# Patient Record
Sex: FEMALE | Race: WHITE | Employment: UNEMPLOYED | URBAN - METROPOLITAN AREA
[De-identification: names, ages, dates, MRNs, and addresses within clinical notes are randomized per-mention and may not be internally consistent; named-entity substitution may affect disease eponyms.]

---

## 2018-12-01 ENCOUNTER — HOSPITAL ENCOUNTER (OUTPATIENT)
Age: 48
Setting detail: OBSERVATION
Discharge: HOME | End: 2018-12-02
Attending: EMERGENCY MEDICINE | Admitting: SURGERY
Payer: SELF-PAY

## 2018-12-01 ENCOUNTER — APPOINTMENT (OUTPATIENT)
Dept: CT IMAGING | Age: 48
End: 2018-12-01
Attending: NURSE PRACTITIONER
Payer: SELF-PAY

## 2018-12-01 ENCOUNTER — ANESTHESIA (OUTPATIENT)
Dept: SURGERY | Age: 48
End: 2018-12-01
Payer: SELF-PAY

## 2018-12-01 ENCOUNTER — ANESTHESIA EVENT (OUTPATIENT)
Dept: SURGERY | Age: 48
End: 2018-12-01
Payer: SELF-PAY

## 2018-12-01 DIAGNOSIS — N28.89 LEFT RENAL MASS: ICD-10-CM

## 2018-12-01 DIAGNOSIS — R10.84 ABDOMINAL PAIN, GENERALIZED: ICD-10-CM

## 2018-12-01 DIAGNOSIS — K57.30 DIVERTICULOSIS OF LARGE INTESTINE WITHOUT HEMORRHAGE: ICD-10-CM

## 2018-12-01 DIAGNOSIS — K35.80 ACUTE APPENDICITIS, UNSPECIFIED ACUTE APPENDICITIS TYPE: Primary | ICD-10-CM

## 2018-12-01 DIAGNOSIS — M25.512 ACUTE PAIN OF LEFT SHOULDER: ICD-10-CM

## 2018-12-01 PROBLEM — K37 APPENDICITIS: Status: ACTIVE | Noted: 2018-12-01

## 2018-12-01 LAB
ALBUMIN SERPL-MCNC: 3.9 G/DL (ref 3.4–5)
ALBUMIN/GLOB SERPL: 1.1 {RATIO} (ref 0.8–1.7)
ALP SERPL-CCNC: 88 U/L (ref 45–117)
ALT SERPL-CCNC: 35 U/L (ref 13–56)
ANION GAP SERPL CALC-SCNC: 7 MMOL/L (ref 3–18)
APPEARANCE UR: ABNORMAL
AST SERPL-CCNC: 23 U/L (ref 15–37)
BACTERIA URNS QL MICRO: ABNORMAL /HPF
BASOPHILS # BLD: 0 K/UL (ref 0–0.1)
BASOPHILS NFR BLD: 0 % (ref 0–2)
BILIRUB DIRECT SERPL-MCNC: <0.1 MG/DL (ref 0–0.2)
BILIRUB SERPL-MCNC: 0.4 MG/DL (ref 0.2–1)
BILIRUB UR QL: NEGATIVE
BUN SERPL-MCNC: 15 MG/DL (ref 7–18)
BUN/CREAT SERPL: 14 (ref 12–20)
CALCIUM SERPL-MCNC: 9.7 MG/DL (ref 8.5–10.1)
CHLORIDE SERPL-SCNC: 102 MMOL/L (ref 100–108)
CK MB CFR SERPL CALC: 1.9 % (ref 0–4)
CK MB SERPL-MCNC: 2.7 NG/ML (ref 5–25)
CK SERPL-CCNC: 140 U/L (ref 26–192)
CO2 SERPL-SCNC: 29 MMOL/L (ref 21–32)
COLOR UR: ABNORMAL
CREAT SERPL-MCNC: 1.07 MG/DL (ref 0.6–1.3)
DIFFERENTIAL METHOD BLD: ABNORMAL
EOSINOPHIL # BLD: 0.2 K/UL (ref 0–0.4)
EOSINOPHIL NFR BLD: 1 % (ref 0–5)
EPITH CASTS URNS QL MICRO: ABNORMAL /LPF (ref 0–5)
ERYTHROCYTE [DISTWIDTH] IN BLOOD BY AUTOMATED COUNT: 12.6 % (ref 11.6–14.5)
GLOBULIN SER CALC-MCNC: 3.4 G/DL (ref 2–4)
GLUCOSE SERPL-MCNC: 110 MG/DL (ref 74–99)
GLUCOSE UR STRIP.AUTO-MCNC: NEGATIVE MG/DL
HCG UR QL: NEGATIVE
HCT VFR BLD AUTO: 40.4 % (ref 35–45)
HGB BLD-MCNC: 14 G/DL (ref 12–16)
HGB UR QL STRIP: NEGATIVE
INR PPP: 1 (ref 0.8–1.2)
KETONES UR QL STRIP.AUTO: NEGATIVE MG/DL
LACTATE BLD-SCNC: 0.86 MMOL/L (ref 0.4–2)
LEUKOCYTE ESTERASE UR QL STRIP.AUTO: ABNORMAL
LIPASE SERPL-CCNC: 181 U/L (ref 73–393)
LYMPHOCYTES # BLD: 3.1 K/UL (ref 0.9–3.6)
LYMPHOCYTES NFR BLD: 22 % (ref 21–52)
MCH RBC QN AUTO: 33 PG (ref 24–34)
MCHC RBC AUTO-ENTMCNC: 34.7 G/DL (ref 31–37)
MCV RBC AUTO: 95.3 FL (ref 74–97)
MONOCYTES # BLD: 0.7 K/UL (ref 0.05–1.2)
MONOCYTES NFR BLD: 5 % (ref 3–10)
NEUTS SEG # BLD: 10 K/UL (ref 1.8–8)
NEUTS SEG NFR BLD: 72 % (ref 40–73)
NITRITE UR QL STRIP.AUTO: POSITIVE
PH UR STRIP: 5 [PH] (ref 5–8)
PLATELET # BLD AUTO: 300 K/UL (ref 135–420)
PMV BLD AUTO: 9.8 FL (ref 9.2–11.8)
POTASSIUM SERPL-SCNC: 4.3 MMOL/L (ref 3.5–5.5)
PROT SERPL-MCNC: 7.3 G/DL (ref 6.4–8.2)
PROT UR STRIP-MCNC: NEGATIVE MG/DL
PROTHROMBIN TIME: 12.6 SEC (ref 11.5–15.2)
RBC # BLD AUTO: 4.24 M/UL (ref 4.2–5.3)
RBC #/AREA URNS HPF: 0 /HPF (ref 0–5)
SODIUM SERPL-SCNC: 138 MMOL/L (ref 136–145)
SP GR UR REFRACTOMETRY: 1.01 (ref 1–1.03)
TROPONIN I SERPL-MCNC: <0.02 NG/ML (ref 0–0.04)
UROBILINOGEN UR QL STRIP.AUTO: 0.2 EU/DL (ref 0.2–1)
WBC # BLD AUTO: 14 K/UL (ref 4.6–13.2)
WBC URNS QL MICRO: ABNORMAL /HPF (ref 0–4)

## 2018-12-01 PROCEDURE — 77030016151 HC PROTCTR LNS DFOG COVD -B: Performed by: SURGERY

## 2018-12-01 PROCEDURE — 77030008477 HC STYL SATN SLP COVD -A: Performed by: ANESTHESIOLOGY

## 2018-12-01 PROCEDURE — 93005 ELECTROCARDIOGRAM TRACING: CPT

## 2018-12-01 PROCEDURE — 74011000250 HC RX REV CODE- 250

## 2018-12-01 PROCEDURE — 74011250636 HC RX REV CODE- 250/636: Performed by: NURSE PRACTITIONER

## 2018-12-01 PROCEDURE — 74011250636 HC RX REV CODE- 250/636: Performed by: NURSE ANESTHETIST, CERTIFIED REGISTERED

## 2018-12-01 PROCEDURE — 81001 URINALYSIS AUTO W/SCOPE: CPT

## 2018-12-01 PROCEDURE — 77030007955 HC PCH ENDOSC SPEC J&J -B: Performed by: SURGERY

## 2018-12-01 PROCEDURE — 96361 HYDRATE IV INFUSION ADD-ON: CPT

## 2018-12-01 PROCEDURE — 80048 BASIC METABOLIC PNL TOTAL CA: CPT

## 2018-12-01 PROCEDURE — 96365 THER/PROPH/DIAG IV INF INIT: CPT

## 2018-12-01 PROCEDURE — 74177 CT ABD & PELVIS W/CONTRAST: CPT

## 2018-12-01 PROCEDURE — 81025 URINE PREGNANCY TEST: CPT

## 2018-12-01 PROCEDURE — 77030018875 HC APPL CLP LIG4 J&J -B: Performed by: SURGERY

## 2018-12-01 PROCEDURE — 74011000250 HC RX REV CODE- 250: Performed by: SURGERY

## 2018-12-01 PROCEDURE — 74011636320 HC RX REV CODE- 636/320: Performed by: EMERGENCY MEDICINE

## 2018-12-01 PROCEDURE — 77030002967 HC SUT PDS J&J -B: Performed by: SURGERY

## 2018-12-01 PROCEDURE — 77030008602 HC TRCR ENDOSC EPATH J&J -B: Performed by: SURGERY

## 2018-12-01 PROCEDURE — 77030008683 HC TU ET CUF COVD -A: Performed by: ANESTHESIOLOGY

## 2018-12-01 PROCEDURE — 77030031139 HC SUT VCRL2 J&J -A: Performed by: SURGERY

## 2018-12-01 PROCEDURE — 77030033639 HC SHR ENDO COAG HARM 36 J&J -E: Performed by: SURGERY

## 2018-12-01 PROCEDURE — 99218 HC RM OBSERVATION: CPT

## 2018-12-01 PROCEDURE — 77030008771 HC TU NG SALEM SUMP -A: Performed by: ANESTHESIOLOGY

## 2018-12-01 PROCEDURE — 77030002933 HC SUT MCRYL J&J -A: Performed by: SURGERY

## 2018-12-01 PROCEDURE — 77030036733 HC ENDOLP LIG VCRL SUT J&J -C: Performed by: SURGERY

## 2018-12-01 PROCEDURE — 96376 TX/PRO/DX INJ SAME DRUG ADON: CPT

## 2018-12-01 PROCEDURE — 96375 TX/PRO/DX INJ NEW DRUG ADDON: CPT

## 2018-12-01 PROCEDURE — 87040 BLOOD CULTURE FOR BACTERIA: CPT

## 2018-12-01 PROCEDURE — 83690 ASSAY OF LIPASE: CPT

## 2018-12-01 PROCEDURE — 77030018836 HC SOL IRR NACL ICUM -A: Performed by: SURGERY

## 2018-12-01 PROCEDURE — 85610 PROTHROMBIN TIME: CPT

## 2018-12-01 PROCEDURE — 76010000138 HC OR TIME 0.5 TO 1 HR: Performed by: SURGERY

## 2018-12-01 PROCEDURE — 74011250636 HC RX REV CODE- 250/636

## 2018-12-01 PROCEDURE — 88305 TISSUE EXAM BY PATHOLOGIST: CPT

## 2018-12-01 PROCEDURE — 88304 TISSUE EXAM BY PATHOLOGIST: CPT

## 2018-12-01 PROCEDURE — 74011250636 HC RX REV CODE- 250/636: Performed by: EMERGENCY MEDICINE

## 2018-12-01 PROCEDURE — 74011000258 HC RX REV CODE- 258: Performed by: EMERGENCY MEDICINE

## 2018-12-01 PROCEDURE — 76060000032 HC ANESTHESIA 0.5 TO 1 HR: Performed by: SURGERY

## 2018-12-01 PROCEDURE — 77030002966 HC SUT PDS J&J -A: Performed by: SURGERY

## 2018-12-01 PROCEDURE — 83605 ASSAY OF LACTIC ACID: CPT

## 2018-12-01 PROCEDURE — 77030008518 HC TBNG INSUF ENDO STRY -B: Performed by: SURGERY

## 2018-12-01 PROCEDURE — 76210000016 HC OR PH I REC 1 TO 1.5 HR: Performed by: SURGERY

## 2018-12-01 PROCEDURE — 85025 COMPLETE CBC W/AUTO DIFF WBC: CPT

## 2018-12-01 PROCEDURE — 74011250636 HC RX REV CODE- 250/636: Performed by: SURGERY

## 2018-12-01 PROCEDURE — 80076 HEPATIC FUNCTION PANEL: CPT

## 2018-12-01 PROCEDURE — 77030003580 HC NDL INSUF VERES J&J -B: Performed by: SURGERY

## 2018-12-01 PROCEDURE — 77030008603 HC TRCR ENDOSC EPATH J&J -C: Performed by: SURGERY

## 2018-12-01 PROCEDURE — 82553 CREATINE MB FRACTION: CPT

## 2018-12-01 PROCEDURE — 99285 EMERGENCY DEPT VISIT HI MDM: CPT

## 2018-12-01 PROCEDURE — 74011000272 HC RX REV CODE- 272: Performed by: SURGERY

## 2018-12-01 PROCEDURE — 77030032490 HC SLV COMPR SCD KNE COVD -B: Performed by: SURGERY

## 2018-12-01 RX ORDER — MAGNESIUM SULFATE 100 %
4 CRYSTALS MISCELLANEOUS AS NEEDED
Status: DISCONTINUED | OUTPATIENT
Start: 2018-12-01 | End: 2018-12-02 | Stop reason: HOSPADM

## 2018-12-01 RX ORDER — SODIUM CHLORIDE 0.9 % (FLUSH) 0.9 %
5-10 SYRINGE (ML) INJECTION AS NEEDED
Status: DISCONTINUED | OUTPATIENT
Start: 2018-12-01 | End: 2018-12-02 | Stop reason: HOSPADM

## 2018-12-01 RX ORDER — INSULIN LISPRO 100 [IU]/ML
INJECTION, SOLUTION INTRAVENOUS; SUBCUTANEOUS ONCE
Status: DISCONTINUED | OUTPATIENT
Start: 2018-12-01 | End: 2018-12-02 | Stop reason: HOSPADM

## 2018-12-01 RX ORDER — FENTANYL CITRATE 50 UG/ML
50 INJECTION, SOLUTION INTRAMUSCULAR; INTRAVENOUS AS NEEDED
Status: DISCONTINUED | OUTPATIENT
Start: 2018-12-01 | End: 2018-12-02 | Stop reason: HOSPADM

## 2018-12-01 RX ORDER — BUPIVACAINE HYDROCHLORIDE AND EPINEPHRINE 5; 5 MG/ML; UG/ML
INJECTION, SOLUTION EPIDURAL; INTRACAUDAL; PERINEURAL AS NEEDED
Status: DISCONTINUED | OUTPATIENT
Start: 2018-12-01 | End: 2018-12-01 | Stop reason: HOSPADM

## 2018-12-01 RX ORDER — DEXTROSE 50 % IN WATER (D50W) INTRAVENOUS SYRINGE
25-50 AS NEEDED
Status: DISCONTINUED | OUTPATIENT
Start: 2018-12-01 | End: 2018-12-02 | Stop reason: HOSPADM

## 2018-12-01 RX ORDER — ONDANSETRON 2 MG/ML
INJECTION INTRAMUSCULAR; INTRAVENOUS AS NEEDED
Status: DISCONTINUED | OUTPATIENT
Start: 2018-12-01 | End: 2018-12-01 | Stop reason: HOSPADM

## 2018-12-01 RX ORDER — KETOROLAC TROMETHAMINE 30 MG/ML
INJECTION, SOLUTION INTRAMUSCULAR; INTRAVENOUS AS NEEDED
Status: DISCONTINUED | OUTPATIENT
Start: 2018-12-01 | End: 2018-12-01 | Stop reason: HOSPADM

## 2018-12-01 RX ORDER — SODIUM CHLORIDE 9 MG/ML
INJECTION, SOLUTION INTRAVENOUS
Status: DISCONTINUED | OUTPATIENT
Start: 2018-12-01 | End: 2018-12-01 | Stop reason: HOSPADM

## 2018-12-01 RX ORDER — VECURONIUM BROMIDE FOR INJECTION 1 MG/ML
INJECTION, POWDER, LYOPHILIZED, FOR SOLUTION INTRAVENOUS AS NEEDED
Status: DISCONTINUED | OUTPATIENT
Start: 2018-12-01 | End: 2018-12-01 | Stop reason: HOSPADM

## 2018-12-01 RX ORDER — GLYCOPYRROLATE 0.2 MG/ML
INJECTION INTRAMUSCULAR; INTRAVENOUS AS NEEDED
Status: DISCONTINUED | OUTPATIENT
Start: 2018-12-01 | End: 2018-12-01 | Stop reason: HOSPADM

## 2018-12-01 RX ORDER — NEOSTIGMINE METHYLSULFATE 5 MG/5 ML
SYRINGE (ML) INTRAVENOUS AS NEEDED
Status: DISCONTINUED | OUTPATIENT
Start: 2018-12-01 | End: 2018-12-01 | Stop reason: HOSPADM

## 2018-12-01 RX ORDER — MORPHINE SULFATE 2 MG/ML
4 INJECTION, SOLUTION INTRAMUSCULAR; INTRAVENOUS
Status: COMPLETED | OUTPATIENT
Start: 2018-12-01 | End: 2018-12-01

## 2018-12-01 RX ORDER — NALOXONE HYDROCHLORIDE 0.4 MG/ML
0.04 INJECTION, SOLUTION INTRAMUSCULAR; INTRAVENOUS; SUBCUTANEOUS AS NEEDED
Status: DISCONTINUED | OUTPATIENT
Start: 2018-12-01 | End: 2018-12-02 | Stop reason: HOSPADM

## 2018-12-01 RX ORDER — DEXAMETHASONE SODIUM PHOSPHATE 4 MG/ML
INJECTION, SOLUTION INTRA-ARTICULAR; INTRALESIONAL; INTRAMUSCULAR; INTRAVENOUS; SOFT TISSUE AS NEEDED
Status: DISCONTINUED | OUTPATIENT
Start: 2018-12-01 | End: 2018-12-01 | Stop reason: HOSPADM

## 2018-12-01 RX ORDER — SODIUM CHLORIDE, SODIUM LACTATE, POTASSIUM CHLORIDE, CALCIUM CHLORIDE 600; 310; 30; 20 MG/100ML; MG/100ML; MG/100ML; MG/100ML
125 INJECTION, SOLUTION INTRAVENOUS CONTINUOUS
Status: DISCONTINUED | OUTPATIENT
Start: 2018-12-01 | End: 2018-12-02 | Stop reason: HOSPADM

## 2018-12-01 RX ORDER — LIDOCAINE HYDROCHLORIDE 20 MG/ML
INJECTION, SOLUTION EPIDURAL; INFILTRATION; INTRACAUDAL; PERINEURAL AS NEEDED
Status: DISCONTINUED | OUTPATIENT
Start: 2018-12-01 | End: 2018-12-01 | Stop reason: HOSPADM

## 2018-12-01 RX ORDER — OXYCODONE AND ACETAMINOPHEN 5; 325 MG/1; MG/1
1 TABLET ORAL ONCE
Status: DISCONTINUED | OUTPATIENT
Start: 2018-12-01 | End: 2018-12-02 | Stop reason: HOSPADM

## 2018-12-01 RX ORDER — HYDROMORPHONE HYDROCHLORIDE 2 MG/ML
0.5 INJECTION, SOLUTION INTRAMUSCULAR; INTRAVENOUS; SUBCUTANEOUS
Status: DISCONTINUED | OUTPATIENT
Start: 2018-12-01 | End: 2018-12-02 | Stop reason: HOSPADM

## 2018-12-01 RX ORDER — PROPOFOL 10 MG/ML
INJECTION, EMULSION INTRAVENOUS AS NEEDED
Status: DISCONTINUED | OUTPATIENT
Start: 2018-12-01 | End: 2018-12-01 | Stop reason: HOSPADM

## 2018-12-01 RX ORDER — SUCCINYLCHOLINE CHLORIDE 20 MG/ML
INJECTION INTRAMUSCULAR; INTRAVENOUS AS NEEDED
Status: DISCONTINUED | OUTPATIENT
Start: 2018-12-01 | End: 2018-12-01 | Stop reason: HOSPADM

## 2018-12-01 RX ORDER — MORPHINE SULFATE 10 MG/ML
8 INJECTION, SOLUTION INTRAMUSCULAR; INTRAVENOUS
Status: COMPLETED | OUTPATIENT
Start: 2018-12-01 | End: 2018-12-01

## 2018-12-01 RX ORDER — ONDANSETRON 2 MG/ML
4 INJECTION INTRAMUSCULAR; INTRAVENOUS ONCE
Status: DISCONTINUED | OUTPATIENT
Start: 2018-12-01 | End: 2018-12-02 | Stop reason: HOSPADM

## 2018-12-01 RX ORDER — FENTANYL CITRATE 50 UG/ML
INJECTION, SOLUTION INTRAMUSCULAR; INTRAVENOUS AS NEEDED
Status: DISCONTINUED | OUTPATIENT
Start: 2018-12-01 | End: 2018-12-01 | Stop reason: HOSPADM

## 2018-12-01 RX ORDER — MIDAZOLAM HYDROCHLORIDE 1 MG/ML
INJECTION, SOLUTION INTRAMUSCULAR; INTRAVENOUS AS NEEDED
Status: DISCONTINUED | OUTPATIENT
Start: 2018-12-01 | End: 2018-12-01 | Stop reason: HOSPADM

## 2018-12-01 RX ADMIN — PROPOFOL 200 MG: 10 INJECTION, EMULSION INTRAVENOUS at 21:45

## 2018-12-01 RX ADMIN — FENTANYL CITRATE 50 MCG: 50 INJECTION, SOLUTION INTRAMUSCULAR; INTRAVENOUS at 23:15

## 2018-12-01 RX ADMIN — SUCCINYLCHOLINE CHLORIDE 100 MG: 20 INJECTION INTRAMUSCULAR; INTRAVENOUS at 21:46

## 2018-12-01 RX ADMIN — Medication 3 MG: at 22:28

## 2018-12-01 RX ADMIN — MIDAZOLAM HYDROCHLORIDE 2 MG: 1 INJECTION, SOLUTION INTRAMUSCULAR; INTRAVENOUS at 21:38

## 2018-12-01 RX ADMIN — LIDOCAINE HYDROCHLORIDE 100 MG: 20 INJECTION, SOLUTION EPIDURAL; INFILTRATION; INTRACAUDAL; PERINEURAL at 21:45

## 2018-12-01 RX ADMIN — ONDANSETRON 4 MG: 2 INJECTION INTRAMUSCULAR; INTRAVENOUS at 22:03

## 2018-12-01 RX ADMIN — SODIUM CHLORIDE 1000 ML: 900 INJECTION, SOLUTION INTRAVENOUS at 20:00

## 2018-12-01 RX ADMIN — SODIUM CHLORIDE 1000 ML: 900 INJECTION, SOLUTION INTRAVENOUS at 19:55

## 2018-12-01 RX ADMIN — SODIUM CHLORIDE: 9 INJECTION, SOLUTION INTRAVENOUS at 21:41

## 2018-12-01 RX ADMIN — KETOROLAC TROMETHAMINE 30 MG: 30 INJECTION, SOLUTION INTRAMUSCULAR; INTRAVENOUS at 22:25

## 2018-12-01 RX ADMIN — FENTANYL CITRATE 50 MCG: 50 INJECTION, SOLUTION INTRAMUSCULAR; INTRAVENOUS at 23:25

## 2018-12-01 RX ADMIN — MORPHINE SULFATE 4 MG: 2 INJECTION, SOLUTION INTRAMUSCULAR; INTRAVENOUS at 16:12

## 2018-12-01 RX ADMIN — MORPHINE SULFATE 8 MG: 10 INJECTION INTRAMUSCULAR; INTRAVENOUS; SUBCUTANEOUS at 19:54

## 2018-12-01 RX ADMIN — DEXAMETHASONE SODIUM PHOSPHATE 4 MG: 4 INJECTION, SOLUTION INTRA-ARTICULAR; INTRALESIONAL; INTRAMUSCULAR; INTRAVENOUS; SOFT TISSUE at 22:05

## 2018-12-01 RX ADMIN — FENTANYL CITRATE 50 MCG: 50 INJECTION, SOLUTION INTRAMUSCULAR; INTRAVENOUS at 22:30

## 2018-12-01 RX ADMIN — PIPERACILLIN SODIUM,TAZOBACTAM SODIUM 4.5 G: 4; .5 INJECTION, POWDER, FOR SOLUTION INTRAVENOUS at 19:57

## 2018-12-01 RX ADMIN — FENTANYL CITRATE 100 MCG: 50 INJECTION, SOLUTION INTRAMUSCULAR; INTRAVENOUS at 21:45

## 2018-12-01 RX ADMIN — FENTANYL CITRATE 50 MCG: 50 INJECTION, SOLUTION INTRAMUSCULAR; INTRAVENOUS at 22:43

## 2018-12-01 RX ADMIN — GLYCOPYRROLATE 0.4 MG: 0.2 INJECTION INTRAMUSCULAR; INTRAVENOUS at 22:28

## 2018-12-01 RX ADMIN — VECURONIUM BROMIDE FOR INJECTION 3 MG: 1 INJECTION, POWDER, LYOPHILIZED, FOR SOLUTION INTRAVENOUS at 21:55

## 2018-12-01 RX ADMIN — IOPAMIDOL 80 ML: 612 INJECTION, SOLUTION INTRAVENOUS at 17:00

## 2018-12-01 NOTE — ED NOTES
Purposeful rounding completed: 
 
Side rails up x 1:  YES Bed in low position and wheels locked: YES Call bell within reach: YES Comfort addressed: YES Toileting needs addressed: YES Plan of care reviewed/updated with patient and or family members: YES 
IV site assessed: YES Pain assessed and addressed: YES, 7

## 2018-12-01 NOTE — ED TRIAGE NOTES
\"I was seen at [facility] on Tuesday for pain in my left shoulder blade. Now, my left arm is hurting and feels numb. \"

## 2018-12-01 NOTE — ED PROVIDER NOTES
Vicente Bhagat is a 46year old female who presents to the ED with a c/o left shoulder and left arm pain. The left arm is numb as well. States this has been ongoing for a week. She was previously seen last week at [facility] ED and a left shoulder XR was performed. Pt states the XR was normal, and was told \"it is probably a pinched nerve. \"   She goes on to say that now she has pain in the epigastric area, adding that she had \"stomach caner about ten years ago. \"  Reports \"they took the tumor out. \"   She was given Percocet for her pain, but it isn't helping. She was also told her blood pressure was high last week, and they started her on HTN medication, which she is complaint with. Past Medical History:  
Diagnosis Date  Cancer of stomach ([facility] [address] Past Surgical History:  
Procedure Laterality Date  HX GI    
 stomach cancer History reviewed. No pertinent family history. Social History Socioeconomic History  Marital status: SINGLE Spouse name: Not on file  Number of children: Not on file  Years of education: Not on file  Highest education level: Not on file Social Needs  Financial resource strain: Not on file  Food insecurity - worry: Not on file  Food insecurity - inability: Not on file  Transportation needs - medical: Not on file  Transportation needs - non-medical: Not on file Occupational History  Not on file Tobacco Use  Smoking status: Current Some Day Smoker Packs/day: 1.00  Smokeless tobacco: Former User Substance and Sexual Activity  Alcohol use: No  
  Frequency: Never  Drug use: No  
 Sexual activity: Not on file Other Topics Concern  Not on file Social History Narrative  Not on file ALLERGIES: Sulfa (sulfonamide antibiotics) Review of Systems Constitutional: Negative. HENT: Negative. Eyes: Negative. Respiratory: Negative. Cardiovascular: Negative. Gastrointestinal: Positive for abdominal pain. Epigastric pain Endocrine: Negative. Genitourinary: Negative. Musculoskeletal: Negative. Skin: Negative. Allergic/Immunologic: Negative. Neurological:  
     Left arm numbness. Psychiatric/Behavioral: Negative. Vitals:  
 12/01/18 1443 BP: (!) 147/105 Pulse: (!) 108 Resp: 16 Temp: 98.4 °F (36.9 °C) SpO2: 100% Weight: 97.5 kg (215 lb) Height: 5' 6\" (1.676 m) Physical Exam  
Constitutional: She appears well-developed and well-nourished. No distress. HENT:  
Head: Normocephalic and atraumatic. Nose: Nose normal.  
Eyes: EOM are normal. Right eye exhibits no discharge. Left eye exhibits no discharge. No scleral icterus. Neck: Normal range of motion. Neck supple. No tracheal deviation present. Cardiovascular: Normal rate, regular rhythm and intact distal pulses. Pulmonary/Chest: Effort normal and breath sounds normal.  
Abdominal: She exhibits no distension and no mass. There is tenderness. There is no guarding. Genitourinary:  
Genitourinary Comments: NE 
  
Musculoskeletal: Normal range of motion. She exhibits no deformity. Neurological: She is alert. Coordination normal.  
Skin: Skin is warm and dry. NE. Psychiatric: She has a normal mood and affect. Her behavior is normal.  
Nursing note and vitals reviewed. MDM Number of Diagnoses or Management Options Abdominal pain, generalized:  
Acute appendicitis, unspecified acute appendicitis type: Acute pain of left shoulder:  
Diverticulosis of large intestine without hemorrhage:  
Left renal mass:  
Diagnosis management comments: MDM:  Will obtain CT scan for evaluation of Pt's pain and possibility of recurrent cancer. Kylie Whipple, NP  7:34 PM 
 
 
 
  
 
Procedures Consult:  Discussed care with Radiology Standard discussion; including history of patients chief complaint, available diagnostic results, and treatment course. DDx of appendicitis. 7:38 PM, 12/1/2018 Consult:  Discussed care with General surgery, Standard discussion; including history of patients chief complaint, available diagnostic results, and treatment course. 7:38 PM, 12/1/2018 Diagnosis: 1. Acute appendicitis, unspecified acute appendicitis type 2. Abdominal pain, generalized 3. Acute pain of left shoulder 4. Left renal mass 5. Diverticulosis of large intestine without hemorrhage Disposition: To the OR. I was personally available for consultation in the emergency department. I have reviewed the chart and agree with the documentation recorded by the [facility], including the assessment, treatment plan, and disposition. Hilary Gonzalez MD 
Directly involved in care Cc ab pain Markedly ttp RLQ on my exam 
CT with appie Abx, ivf 
60 kg IBW, include abx fluid volume in bolus Case d./w Dr Moses Licea who examiend at [facility] and took to [address] [facility] [address] Follow-up Information None Medication List  
  
ASK your doctor about these medications   
amLODIPine 10 mg tablet Commonly known as:  Antoniette Yaphank Take 0.5 Tabs by mouth daily for 20 days. diazePAM 5 mg tablet Commonly known as:  VALIUM 
1 tab every 8 hours as needed pain. Watch sedation 
  
methocarbamol 500 mg tablet Commonly known as:  ROBAXIN Take 2 Tabs by mouth four (4) times daily as needed. naloxone 4 mg/actuation nasal spray Commonly known as:  [employer] Use 1 spray intranasally, then discard. Repeat with new spray every 2 min as needed for opioid overdose symptoms, alternating nostrils. PERCOCET 5-325 mg per tablet Generic drug:  oxyCODONE-acetaminophen

## 2018-12-02 VITALS
TEMPERATURE: 98.2 F | OXYGEN SATURATION: 97 % | WEIGHT: 215 LBS | BODY MASS INDEX: 34.55 KG/M2 | HEIGHT: 66 IN | SYSTOLIC BLOOD PRESSURE: 151 MMHG | DIASTOLIC BLOOD PRESSURE: 99 MMHG | HEART RATE: 74 BPM | RESPIRATION RATE: 16 BRPM

## 2018-12-02 LAB
ATRIAL RATE: 102 BPM
CALCULATED P AXIS, ECG09: 51 DEGREES
CALCULATED R AXIS, ECG10: 30 DEGREES
CALCULATED T AXIS, ECG11: 52 DEGREES
DIAGNOSIS, 93000: NORMAL
P-R INTERVAL, ECG05: 152 MS
Q-T INTERVAL, ECG07: 356 MS
QRS DURATION, ECG06: 70 MS
QTC CALCULATION (BEZET), ECG08: 463 MS
VENTRICULAR RATE, ECG03: 102 BPM

## 2018-12-02 PROCEDURE — 77030027138 HC INCENT SPIROMETER -A

## 2018-12-02 PROCEDURE — 74011250637 HC RX REV CODE- 250/637: Performed by: SURGERY

## 2018-12-02 PROCEDURE — 74011250636 HC RX REV CODE- 250/636: Performed by: SURGERY

## 2018-12-02 PROCEDURE — 74011000258 HC RX REV CODE- 258: Performed by: SURGERY

## 2018-12-02 PROCEDURE — 99218 HC RM OBSERVATION: CPT

## 2018-12-02 RX ORDER — SODIUM CHLORIDE 0.9 % (FLUSH) 0.9 %
5-10 SYRINGE (ML) INJECTION AS NEEDED
Status: DISCONTINUED | OUTPATIENT
Start: 2018-12-02 | End: 2018-12-02 | Stop reason: HOSPADM

## 2018-12-02 RX ORDER — DEXTROSE MONOHYDRATE AND SODIUM CHLORIDE 5; .45 G/100ML; G/100ML
125 INJECTION, SOLUTION INTRAVENOUS CONTINUOUS
Status: DISCONTINUED | OUTPATIENT
Start: 2018-12-02 | End: 2018-12-02 | Stop reason: HOSPADM

## 2018-12-02 RX ORDER — OXYCODONE AND ACETAMINOPHEN 5; 325 MG/1; MG/1
1-2 TABLET ORAL
Status: DISCONTINUED | OUTPATIENT
Start: 2018-12-02 | End: 2018-12-02 | Stop reason: HOSPADM

## 2018-12-02 RX ORDER — ENOXAPARIN SODIUM 100 MG/ML
40 INJECTION SUBCUTANEOUS EVERY 24 HOURS
Status: DISCONTINUED | OUTPATIENT
Start: 2018-12-02 | End: 2018-12-02 | Stop reason: HOSPADM

## 2018-12-02 RX ORDER — HYDROMORPHONE HYDROCHLORIDE 1 MG/ML
1 INJECTION, SOLUTION INTRAMUSCULAR; INTRAVENOUS; SUBCUTANEOUS
Status: DISCONTINUED | OUTPATIENT
Start: 2018-12-02 | End: 2018-12-02 | Stop reason: HOSPADM

## 2018-12-02 RX ORDER — SODIUM CHLORIDE 0.9 % (FLUSH) 0.9 %
5-10 SYRINGE (ML) INJECTION EVERY 8 HOURS
Status: DISCONTINUED | OUTPATIENT
Start: 2018-12-02 | End: 2018-12-02 | Stop reason: HOSPADM

## 2018-12-02 RX ORDER — DIAZEPAM 10 MG/2ML
5 INJECTION INTRAMUSCULAR
Status: DISCONTINUED | OUTPATIENT
Start: 2018-12-02 | End: 2018-12-02 | Stop reason: HOSPADM

## 2018-12-02 RX ORDER — AMLODIPINE BESYLATE 5 MG/1
5 TABLET ORAL DAILY
Status: DISCONTINUED | OUTPATIENT
Start: 2018-12-02 | End: 2018-12-02 | Stop reason: HOSPADM

## 2018-12-02 RX ADMIN — Medication 10 ML: at 07:12

## 2018-12-02 RX ADMIN — AMLODIPINE BESYLATE 5 MG: 5 TABLET ORAL at 09:14

## 2018-12-02 RX ADMIN — PIPERACILLIN SODIUM,TAZOBACTAM SODIUM 3.38 G: 3; .375 INJECTION, POWDER, FOR SOLUTION INTRAVENOUS at 11:57

## 2018-12-02 RX ADMIN — PIPERACILLIN SODIUM,TAZOBACTAM SODIUM 3.38 G: 3; .375 INJECTION, POWDER, FOR SOLUTION INTRAVENOUS at 04:10

## 2018-12-02 RX ADMIN — DEXTROSE MONOHYDRATE AND SODIUM CHLORIDE 125 ML/HR: 5; .45 INJECTION, SOLUTION INTRAVENOUS at 01:14

## 2018-12-02 RX ADMIN — OXYCODONE AND ACETAMINOPHEN 2 TABLET: 5; 325 TABLET ORAL at 13:05

## 2018-12-02 RX ADMIN — OXYCODONE AND ACETAMINOPHEN 2 TABLET: 5; 325 TABLET ORAL at 09:13

## 2018-12-02 RX ADMIN — ENOXAPARIN SODIUM 40 MG: 40 INJECTION, SOLUTION INTRAVENOUS; SUBCUTANEOUS at 01:13

## 2018-12-02 RX ADMIN — OXYCODONE AND ACETAMINOPHEN 2 TABLET: 5; 325 TABLET ORAL at 04:09

## 2018-12-02 NOTE — PROGRESS NOTES
met with Patient completed the initial Spiritual Assessment of the patient, and offered Pastoral Care, see flow sheets for interventions. Patient does not have any Buddhist/cultural needs that will affect patients preferences in health care. Charted reviewed. Chaplains will continue to follow and will provide pastoral care on an as needed/requested basis. Andrea, MPH 
[facility] [phone]

## 2018-12-02 NOTE — PROGRESS NOTES
Problem: Discharge Planning Goal: *Discharge to safe environment Outcome: Resolved/Met Date Met: 12/02/18 
home

## 2018-12-02 NOTE — PROGRESS NOTES
0015: Received patient in bed awake,alert and oriented x4. No signs of distress. Orientate  patient to the unit/safety assess. Denies any pain at this time. Skin check done with MARGARET Lopez. Patient skin is intact. Valuables and call bell within reach. Will continue monitoring. 8805: In bed sleeping ,snoring at times, no distress noted. .Call bell within reach. Will continue monitoring. 0700: In bed sleep at this time,no other concern  at this time,no distress. 0630 : In bed resting quietly,no nausea/vomiting throughout the shift,pt ambulating in the bathroom,encourage to ambulate in the hallway explain rationale for early ambulation \" states want to sleep will ambulate in AM ' ,resting now. Call bell within reach. Will continue monitoring. Visit Vitals /89 (BP 1 Location: Right arm, BP Patient Position: At rest) Pulse 68 Temp 98.6 °F (37 °C) Resp 16 Ht 5' 6\" (1.676 m) Wt 97.5 kg (215 lb) SpO2 95% Breastfeeding? No  
BMI 34.70 kg/m²

## 2018-12-02 NOTE — DISCHARGE SUMMARY
Physician Discharge Summary     Patient ID:  Lucy Andrade  [id number]  46 y.o.  1970    Admit Date: 12/1/2018    Discharge Date: 12/2/2018    Diagnoses: Appendicitis [K37] and back pain      Discharge Condition: Stable    Last Procedure: Procedure(s):  [place] Course:   Normal hospital course for this procedure. Gave abx postop for min fluid near inflamed area of appendix. Back pain likely musculoskeletal.  Hernia pain stable, and per pt meds from oncology. Consults: None    Significant Diagnostic Studies: CT showed appendicitis. Gastric area stable from 2012 and kidney some growth. Disposition: home    Patient Instructions:   Current Discharge Medication List      CONTINUE these medications which have NOT CHANGED    Details   amLODIPine (NORVASC) 10 mg tablet Take 0.5 Tabs by mouth daily for 20 days. Qty: 10 Tab, Refills: 0      oxyCODONE-acetaminophen (PERCOCET) 5-325 mg per tablet Take 1-2 Tabs by mouth every four (4) hours as needed for Pain. methocarbamol (ROBAXIN) 500 mg tablet Take 2 Tabs by mouth four (4) times daily as needed. Qty: 20 Tab, Refills: 0      diazePAM (VALIUM) 5 mg tablet 1 tab every 8 hours as needed pain. Watch sedation  Qty: 6 Tab, Refills: 0    Associated Diagnoses: Muscle spasm      naloxone (NARCAN) 4 mg/actuation nasal spray Use 1 spray intranasally, then discard. Repeat with new spray every 2 min as needed for opioid overdose symptoms, alternating nostrils. Qty: 1 Each, Refills: 0           Activity: may walk. Limit lifting. Diet: Regular Diet  Wound Care: May shower. Follow-up with me in 1 week or is to call to be seen sooner if there are any problems.     Signed:  Jesus Florez MD  12/2/2018  10:57 AM

## 2018-12-02 NOTE — BRIEF OP NOTE
BRIEF OPERATIVE NOTE Date of Procedure: 12/1/2018 Preoperative Diagnosis: Acute appendicitis Postoperative Diagnosis: Acute appendicitis Procedure(s): 
APPENDECTOMY LAPAROSCOPIC Surgeon(s) and Role: * Cele Busch MD - Primary Surgical Staff: 
Circ-1: Michael Rivera RN Scrub Tech-1: Cezar Coles Surg Asst-1: Sully Drake Event Time In Time Out Incision Start 2155 Incision Close 2234 Anesthesia: General  
Estimated Blood Loss: min Specimens:  
ID Type Source Tests Collected by Time Destination 1 : appendix Preservative Appendix  Cele Busch MD 12/1/2018 2219 Pathology 2 : random biopsy colon mesentary Preservative   Cele Busch MD 12/1/2018 2224 Pathology Findings: acute appendicitis with min fluid at tip. Complications: none Implants: * No implants in log * Zosyn in ER Marcaine 0.5% with 20cc SCDs Francis Durbin MD 
 
[id number]

## 2018-12-02 NOTE — OP NOTES
[facility] RE[facility]    Sharath Wilkins  MR#: [id number]  : 1970  ACCOUNT #: [de-identified]   DATE OF SERVICE: 2018    PREOPERATIVE DIAGNOSIS:  Acute appendicitis. POSTOPERATIVE DIAGNOSIS:  Acute appendicitis with minimal fluid and scarring at the tip where the inflammation was located. PROCEDURE PERFORMED:  Laparoscopic appendectomy. SURGEON:  MD [facility]ankaj [facility].    ANESTHESIA:  General endotracheal anesthesia. FINDINGS:  The patient had a normal-appearing appendix at the appendix cecal junction with the inflammation isolated to the tip which was scarred into the retroperitoneal space, consistent with her preoperative CT scan findings. There was minimal fluid at this site. The patient did have some implants noted on the cecum and the mesentery of the colon, which looked like small cysts, but with her history of gastric cancer, I did remove one of these for biopsy. COMPLICATIONS:  None. ESTIMATED BLOOD LOSS:  Minimal.      PATHOLOGIC SPECIMEN:  1. Appendix. 2.  Random biopsy off the colon mesentery. INDICATIONS:  The patient is a 52-year-old female who has been experiencing some back pain for a couple of weeks, which was previously evaluated. In addition, over the last 24 hours, she has had some upper abdominal discomfort that moved more to the right hand side. A CT scan was performed and revealed appendicitis. She and I discussed the procedure of laparoscopic appendectomy as well as the risks of bleeding, infection, injury, and need to open. I also discussed with her the fact that the back pain, likely would not resolve with this procedure. DESCRIPTION OF PROCEDURE:  The patient was taken to the operating room and placed supine on the operating room table. Zosyn had been given in the emergency room. A general endotracheal anesthetic was induced after sequential compression devices were in place.   The left arm was padded and tucked. She was prepped with ChloraPrep and draped in a sterile fashion. A timeout was held, and all agreed with the plan, patient, and procedure. Of note, the patient had voided in the emergency room before coming up to the OR. Due to her history of a large sheet of mesh in the upper abdomen, I opted to infuse local in the left lower quadrant and used a 5 mm Optiview type trocar to advance. I was at a place where I was not certain if I was in the preperitoneal space or had just entered the peritoneum, and I began to insufflate. It became very clear that I had not yet gone to the peritoneum, so the trocar was advanced slightly, and I was in the peritoneal space. There was a little bit of air within the preperitoneal space as expected. Under visualization, the mesh had some adhesions of omentum, but otherwise looked good and adherent. Her bladder was full of urine. The visualized liver and small bowel was all normal.  Under visualization, a 12 mm trocar was placed at the inferior aspect of the umbilicus after local and a nick in the skin. Another trocar was placed in the lower abdomen, being careful to be above the bladder, again, after local and a nick in the skin. The patient was positioned head down and rotated to the left. The cecal appendiceal junction was easily visualized, and the appendix was normal.  At this site, I used the Harmonic to divide the mesoappendix moving towards the tip. The tip of the appendix was inflamed and stuck into the retroperitoneal space. I did clear around this both with blunt dissection and using the Harmonic to dissect this free. A suction device was used to suction a small amount of fluid in from this area as well as the minimal amount of blood. At this point, two Endoloops were placed at the cecal appendiceal junction. One was PDS and one was Vicryl because this is what was available.   At this point, the appendix was divided with the Harmonic and the mucosa within was cauterized. The appendix was placed in an EndoCatch bag and removed from the umbilical site. This was sent off to pathology. At this point, I reinspected, and there was no sign of any bleeding. There was no residual fluid identified. The patient had small, cystic like implants over the mesentery of the cecum as well as on the cecum itself. I removed a couple of these from the mesentery of the cecum to send off for pathology. These did not specifically look like cancer implants, but with her history of gastric cancer, I did think that it was worth evaluating. I was not able to see her stomach due to the scarring in the upper abdomen of the omentum. There was also a lot of small bowel covering this area. Her liver appeared normal.  Her gallbladder was full, but there was no sign of acute inflammation. At this point, the gas was evacuated from the abdomen, and the trocars were removed. A Vicryl stitch was used to place a stitch at the umbilical site, which closed the fascia well. I infused additional local using a total of 20 mL. And 4-0 Monocryl buried interrupted and running subcuticular stitches were used to close the skin followed by Dermabond. The patient tolerated the procedure well and went to the recovery room in stable condition.       MD MANJIT Cantu / RN  D: 12/01/2018 22:47     T: 12/02/2018 08:22  JOB #: [id number]

## 2018-12-02 NOTE — DISCHARGE INSTRUCTIONS
Patient: Cara Cordoba MRN: [id number]  SSN: [id number]    YOB: 1970  Age: 46 y.o. Sex: female      Activity  · As tolerated, walking encourage, stairs are okay  · Avoid strenuous activities - no lifting anything heavier than 15 pounds. · You may shower starting tomorrow. Diet  · Regular diet. · If nausea and vomiting that continues, call your doctor    Pain  · Take pain medication as directed    Dressing Care  Pat incisions dry post shower  Can use abdominal binder for comfort    Follow-Up Phone Calls  · Call [phone]  tomorrow to make your follow-up appointment. · If you have any problems, call your doctor as needed    Call your doctor if  · Excessive bleeding that does not stop after holding mild pressure over the area  · Temperature of 101 degrees F or above  · Redness,excessive swelling or bruising, and/or green or yellow, smelly discharge from incision    After Anesthesia  · For the first 24 hours and while on narcotics: DO NOT Drive, Drink alcoholic beverages, or Make important decisions  · Be aware of dizziness following anesthesia and while taking pain medication     Continue home medications as previously prescribed unless instructed by your doctor. Janette Clark MD              Learning About Appendectomy  What is an appendectomy? An appendectomy is surgery to take out the appendix. This organ is a small sac that is shaped like a finger. It's attached to your large intestine. Appendicitis happens when the appendix becomes infected and inflamed. An appendectomy is the main treatment for it. If surgery is delayed, the inflamed appendix may burst. A burst appendix can cause serious health problems. If your appendix has burst, you may need an emergency surgery to remove the burst appendix. How is this surgery done? Before surgery, you will get medicine to make you sleep. Appendectomy is usually done as a laparoscopic surgery.  That means it is done with only small cuts. These cuts are called incisions. The doctor puts a lighted tube, or scope, and other surgical tools through the cuts in your belly. The doctor is able to see your organs with the scope. The doctor removes the appendix. The cuts heal quickly, and the scars usually fade over time. In some cases, the surgery is done through a single larger cut in the belly. This is called open surgery. What can you expect after surgery? Most people leave the hospital 1 to 3 days after surgery. Some even go home the same day. After you go home, it is normal to feel weak and tired for several days. Your belly may be swollen and may be painful. If you had laparoscopic surgery, you may have shoulder pain for about 24 hours. You may also feel sick to your stomach and have diarrhea, constipation, gas, or a headache. This usually goes away in a few days. Your recovery time depends on the type of surgery you had. If you had laparoscopic surgery, you will probably be able to go back to work or your normal routine 1 to 3 weeks after surgery. If you had an open surgery, it may take 2 to 4 weeks. If your appendix burst, you may have a drain in your incision. Your body will work fine without an appendix. You will not have to make any changes in your diet or daily life. After surgery, be sure to follow your doctor's advice about problems to watch for. These may include fever, worse belly pain, or problems with your incision. Follow-up care is a key part of your treatment and safety. Be sure to make and go to all appointments, and call your doctor if you are having problems. It's also a good idea to know your test results and keep a list of the medicines you take. Where can you learn more? Go to http://ashlee-irma.info/. Enter Y657 in the search box to learn more about \"Learning About Appendectomy. \"  Current as of: March 28, 2018  Content Version: 11.8  © 0222-5561 Healthwise, [employer].  [facility] instructions adapted under license by [employer] (which disclaims liability or warranty for this information). If you have questions about a medical condition or this instruction, always ask your healthcare professional. [address] any warranty or liability for your use of this information. Appendectomy: What to Expect at Home  Your Recovery    Your doctor removed your appendix either by making many small cuts, called incisions, in your belly (laparoscopic surgery) or through open surgery. In open surgery, the doctor makes one large incision. The incisions leave scars that usually fade over time. After your surgery, it is normal to feel weak and tired for several days after you return home. Your belly may be swollen and may be painful. If you had laparoscopic surgery, you may have pain in your shoulder for about 24 hours. You may also feel sick to your stomach and have diarrhea, constipation, gas, or a headache. This usually goes away in a few days. Your recovery time depends on the type of surgery you had. If you had laparoscopic surgery, you will probably be able to return to work or a normal routine 1 to 3 weeks after surgery. If you had an open surgery, it may take 2 to 4 weeks. If your appendix ruptured, you may have a drain in your incision. Your body will work fine without an appendix. You will not have to make any changes in your diet or lifestyle. This care sheet gives you a general idea about how long it will take for you to recover. But each person recovers at a different pace. Follow the steps below to get better as quickly as possible. How can you care for yourself at home? Activity    · Rest when you feel tired. Getting enough sleep will help you recover.     · Try to walk each day. Start by walking a little more than you did the day before. Bit by bit, increase the amount you walk.  Walking boosts blood flow and helps prevent pneumonia and constipation.     · For about 2 weeks, avoid lifting anything that would make you strain. This may include a child, heavy grocery bags and milk containers, a heavy briefcase or backpack, cat litter or dog food bags, or a vacuum .     · Avoid strenuous activities, such as bicycle riding, jogging, weight lifting, or aerobic exercise, until your doctor says it is okay.     · You may be able to take showers (unless you have a drain near your incision) 24 to 48 hours after surgery. Pat the incision dry. Do not take a bath for the first 2 weeks, or until your doctor tells you it is okay. If you have a drain near your incision, follow your doctor's instructions.     · You may drive when you are no longer taking pain medicine and can quickly move your foot from the gas pedal to the brake. You must also be able to sit comfortably for a long period of time, even if you do not plan on going far. You might get caught in traffic.     · You will probably be able to go back to work in 1 to 3 weeks. If you had an open surgery, it may take 3 to 4 weeks.     · Your doctor will tell you when you can have sex again. Diet    · You can eat your normal diet. If your stomach is upset, try bland, low-fat foods like plain rice, broiled chicken, toast, and yogurt.     · Drink plenty of fluids (unless your doctor tells you not to).     · You may notice that your bowel movements are not regular right after your surgery. This is common. Try to avoid constipation and straining with bowel movements. You may want to take a fiber supplement every day. If you have not had a bowel movement after a couple of days, ask your doctor about taking a mild laxative. Medicines    · Your doctor will tell you if and when you can restart your medicines. He or she will also give you instructions about taking any new medicines.     · If you take blood thinners, such as warfarin (Coumadin), clopidogrel (Plavix), or aspirin, be sure to talk to your doctor. He or she will tell you if and when to start taking those medicines again. Make sure that you understand exactly what your doctor wants you to do.     · If your appendix ruptured, you will need to take antibiotics. Take them as directed. Do not stop taking them just because you feel better. You need to take the full course of antibiotics.     · Be safe with medicines. Take pain medicines exactly as directed. ? If the doctor gave you a prescription medicine for pain, take it as prescribed. ? If you are not taking a prescription pain medicine, take an over-the-counter medicine such as acetaminophen (Tylenol), ibuprofen (Advil, Motrin), or naproxen (Aleve). Read and follow all instructions on the label. ? Do not take two or more pain medicines at the same time unless the doctor told you to. Many pain medicines have acetaminophen, which is Tylenol. Too much Tylenol can be harmful.     · If you think your pain medicine is making you sick to your stomach:  ? Take your medicine after meals (unless your doctor has told you not to). ? Ask your doctor for a different pain medicine. Incision care    · If you had an open surgery, you may have staples in your incision. The doctor will take these out in 7 to 10 days.     · If you have strips of tape on the incision, leave the tape on for a week or until it falls off.     · You may wash the area with warm, soapy water 24 to 48 hours after your surgery, unless your doctor tells you not to. Pat the area dry.     · Keep the area clean and dry. You may cover it with a gauze bandage if it weeps or rubs against clothing. Change the bandage every day.     · If your appendix ruptured, you may have an incision with packing in it. Change the packing as often as your doctor tells you to. ? Packing changes may hurt at first. Taking pain medicine about half an hour before you change the dressing can help.   ? If your dressing sticks to your wound, try soaking it with warm water for about 10 minutes before you remove it. You can do this in the shower or by placing a wet washcloth over the dressing. ? Remove the old packing and flush the incision with water. Gently pat the top area dry. ? The size of the incision determines how much gauze you need to put inside. Fold the gauze over once, but do not wad it up so that it hurts. Put it in the wound carefully. You want to keep the sides of the wound from touching. A cotton swab may help you push the gauze in as needed. ? Put a gauze pad over the wound, and tape it down. ? You may notice greenish gray fluid seeping from your wound as you start to heal. This is normal. It is a sign that your wound is healing. Follow-up care is a key part of your treatment and safety. Be sure to make and go to all appointments, and call your doctor if you are having problems. It's also a good idea to know your test results and keep a list of the medicines you take. When should you call for help? Call 911 anytime you think you may need emergency care. For example, call if:    · You passed out (lost consciousness).     · You are short of breath. Jennifer Dotter your doctor now or seek immediate medical care if:    · You are sick to your stomach and cannot drink fluids.     · You cannot pass stools or gas.     · You have pain that does not get better when you take your pain medicine.     · You have signs of infection, such as:  ? Increased pain, swelling, warmth, or redness. ? Red streaks leading from the wound. ? Pus draining from the wound. ? A fever.     · You have loose stitches, or your incision comes open.     · Bright red blood has soaked through the bandage over your incision.     · You have signs of a blood clot in your leg (called a deep vein thrombosis), such as:  ? Pain in your calf, back of knee, thigh, or groin. ?  Redness and swelling in your leg or groin.    Watch closely for any changes in your health, and be sure to contact your doctor if you have any problems. Where can you learn more? Go to http://ashlee-irma.info/. Enter N903 in the search box to learn more about \"Appendectomy: What to Expect at Home. \"  Current as of: March 28, 2018  Content Version: 11.8  © 5932-4860 Healthwise[employer]. Care instructions adapted under license by [employer] (which disclaims liability or warranty for this information). If you have questions about a medical condition or this instruction, always ask your healthcare professional. [address] any warranty or liability for your use of this information.

## 2018-12-02 NOTE — PROGRESS NOTES
Problem: Falls - Risk of 
Goal: *Absence of Falls Document [place] Fall Risk and appropriate interventions in the flowsheet. Outcome: Progressing Towards Goal 
Fall Risk Interventions: 
Mobility Interventions: Patient to call before getting OOB Medication Interventions: Patient to call before getting OOB, Teach patient to arise slowly Elimination Interventions: Call light in reach History of Falls Interventions: Door open when patient unattended, Room close to nurse's station

## 2018-12-02 NOTE — PROGRESS NOTES
Reason for Admission:   appendectomy RRAT Score:        4 Plan for utilizing home health:    no   
                 
Likelihood of Readmission:  low Transition of Care Plan:    Spoke with pt, lives with a room mate. Designates her mother for dcp and contact, she lives in [place]. Thinks room mate may pick her up, if not will need cab. Independent with adls and amb. Uninsured, applied for mela 2 days ago. Needs pcp, referral to . obs letter given. Plan home. Patient has designated _________mother_______________ to participate in his/her discharge plan and to receive any needed information. Name: Beni Donaldson Phone number: [phone] Patient and/or next of kin has been given the Outpatient Observation Information and Notification letter and all questions answered. Care Management Interventions PCP Verified by CM: Yes 
Palliative Care Criteria Met (RRAT>21 & CHF Dx)?: No 
Mode of Transport at Discharge: Other (see comment) Transition of Care Consult (CM Consult): Discharge Planning Discharge Durable Medical Equipment: No 
Physical Therapy Consult: No 
Occupational Therapy Consult: No 
Speech Therapy Consult: No 
Current Support Network: Other Confirm Follow Up Transport: Cab Plan discussed with Pt/Family/Caregiver: Yes Discharge Location Discharge Placement: Home

## 2018-12-02 NOTE — ANESTHESIA POSTPROCEDURE EVALUATION
Procedure(s): 
APPENDECTOMY LAPAROSCOPIC. Anesthesia Post Evaluation Multimodal analgesia: multimodal analgesia used between 6 hours prior to anesthesia start to PACU discharge Patient location during evaluation: bedside Patient participation: complete - patient participated Level of consciousness: awake Pain score: 6 Pain management: adequate Airway patency: patent Anesthetic complications: no 
Cardiovascular status: acceptable Respiratory status: acceptable Hydration status: acceptable Post anesthesia nausea and vomiting:  none Visit Vitals /84 (BP 1 Location: Right arm, BP Patient Position: At rest;Supine; Head of bed elevated (Comment degrees)) Pulse 85 Temp 37 °C (98.6 °F) Resp 27 Ht 5' 6\" (1.676 m) Wt 97.5 kg (215 lb) SpO2 100% BMI 34.70 kg/m²

## 2018-12-02 NOTE — PROGRESS NOTES
Sore, but feels better. Says she has enough meds for pain at home. Visit Vitals /89 (BP 1 Location: Right arm, BP Patient Position: At rest) Pulse 67 Temp 98.5 °F (36.9 °C) Resp 16 Ht 5' 6\" (1.676 m) Wt 97.5 kg (215 lb) SpO2 95% Breastfeeding? No  
BMI 34.70 kg/m² Abd soft, incisional tender (and stable tenderness at right of hernia) POD#1 post lap appy. Min fluid, so will give one more dose abx and home later. Reviewed OR findings.  
Gopi Gambino MD

## 2018-12-02 NOTE — CONSULTS
[address]  MR#: [id number]  : 1970  ACCOUNT #: [de-identified]   DATE OF SERVICE: 2018    REASON FOR CONSULTATION:  I was asked by Dr. Chandrika Roth in the emergency room to evaluate and give my opinion on this patient's probable appendicitis. HISTORY OF PRESENT ILLNESS:  The patient is a 42-year-old female who has been having 2 weeks of pain, which seems to be in the left shoulder beneath her shoulder blade. She was actually seen at [facility] and had a chest x-ray and states that she was given medications for her heart as well as Valium as a muscle relaxer. She continues to have this pain at this time. Of note, she had a different type of pain in the left upper quadrant, which started last night. This continues to be in this area and does sound like it was worse with bounces of a car. She does feel like the back pain has been slowly getting worse over the last 2 weeks. Her appetite has been normal.  She has had no nausea or vomiting. Her bowel movements are fine other than she has noted a subtle color change where they seem to be more pale. PAST MEDICAL HISTORY:  Significant for gastric cancer, which was treated surgically 10 years ago. Her last full body scan was done in July and she was told that this was fine. She did have kidney problems as a child and is not clear exactly what was done to manage or treat these. There is a lesion on the kidney that is currently being followed. PAST SURGICAL HISTORY:  Upper abdominal exploration for a gastric cancer with resection. Following this, she had a hernia repair with mesh. She had some sort of urologic procedures as a child, which she does not think were done abdominally. She also had repair of a vaginal laceration after having a child. CURRENT MEDICATIONS:  Include Percocet for which she has been on 8 years for pain secondary to her hernia repair. She is also on vitamins.   She is taking some homeopathic medications for menopause. ALLERGIES:   SHE DOES HAVE AN ALLERGIC REACTION TO SULFA TO WHICH SHE HAS ANAPHYLAXIS. SOCIAL HISTORY:  She is currently smoking one-half pack per day. She occasionally drinks alcohol. 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 FAMILY HISTORY:  Her mother has asthma and arthritis, otherwise she does not know of any medical problems. REVIEW OF SYSTEMS:  CONSTITUTIONAL:  Her weight has been up slightly as she has reached menopause. She is also having hot flashes and occasional chills. HEENT:  She has no troubles with hearing. She does wear glasses. CARDIAC:  No chest pain or heart problems that she can describe. PULMONARY:  No cough or shortness of breath. GASTROINTESTINAL:  As per the history of present illness. GENITOURINARY:  No current problems with urination:  No history of stroke or seizure. HEMATOLOGIC:  She does bruise easily, but does not know of any history of easy bleeding or clots in the legs. ENDOCRINE:  She does have a history of thyroid nodules which have been worked up previously. MUSCULOSKELETAL:  She does have arthritis in her back. PHYSICAL EXAMINATION:  VITAL SIGNS:  Temperature 98.4 with a heart rate of 108 and a blood pressure 157/93 and a saturation of 98% on room air. GENERAL:  The patient is an obese  female in no apparent distress. HEAD:  Her sclerae of normal color. Her mucous membranes are dry. NECK:  No adenopathy is noted. HEART:  Regular. PULMONARY:  Clear to auscultation bilaterally. ABDOMEN:  Soft. She does have a long upper midline scar consistent with her previous surgical history. She is tender around the edges of what is likely the mesh. This is where she is most tender on the right hand side. She has some peritoneal signs on the right mid abdomen with percussion, otherwise there are no other peritoneal signs. EXTREMITIES:  Examination notes of edema.   VASCULAR:  2+ radial and posterior tibial pulses bilaterally. NEUROLOGIC:  Her cranial nerves are grossly intact. She moves all 4 extremities normally. LABORATORY DATA:  White blood cell count is 14 with a hemoglobin 14 and a hematocrit of 40.4 with a platelet count of 568. Her urinalysis does have positive nitrites and small leukocyte esterase. Her coagulation studies were normal.  Her CMP is normal other than a mildly elevated glucose of 110. RADIOGRAPHIC DATA: A CT scan was performed, and I personally reviewed this. There is a dilated and air filled appendix with a tip that is swollen with periappendiceal inflammatory changes. She does have diverticulosis. There is a soft tissue density adjacent to her gastric resection site, which was compared to an outside CT from 2012 and felt to be stable over the last 6 years. There is also an exophytic mass from the left superior renal pole which is minimally changed since 2012. IMPRESSION:  The patient is a 68-year-old female who does not have typical signs and symptoms for appendicitis, but she clearly has inflammation of the appendix. I discussed with her laparoscopic appendectomy. We did discuss that there is the option of antibiotics and nonsurgical treatment, but with all of her other abdominal pain, I do think it would be hard to follow her clinically. I did discuss with her that we would evaluate the area of the right abdomen where she was having tenderness, although I think there is a good chance that she will have a lot of upper abdominal scarring which will make this difficult. I do think it will be possible to stay in the lower abdomen away from this. We discussed the procedure in detail as well as the risk of bleeding, infection, injury, and need to open. I also explained to her that I thought the surgery would have absolutely no effect on the back pain that she is currently complaining of.   It does sound like she has a history of gallbladder sludge, and should she continue to have pain, further workup of this may be indicated. She states that the renal lesion is currently being followed by another physician, and she routinely sees oncology for the history of gastric cancer. I also discussed with her the importance of tobacco cessation. All of her questions were answered, and she does want to proceed.       Wilfred Lazo MD       VAS / PN  D: 12/01/2018 21:21     T: 12/02/2018 01:33  JOB #: [id number]

## 2018-12-02 NOTE — PERIOP NOTES
Recd care of pt from OR via stretcher. Resp even and unlabored. Attached to monitor. VSS. OR, MAR and anesthesia report acknowledged. Will cont to monitor. 0  Pt c/o feeling like she has to void. Unable to use bedpan. Straight cathed for 900 ml clear yellow urine. Will cont to monitor. 2330  Pt eating ice chips. Drifts off to sleep and needs to be reminded to deep breath. Pt teaching regarding laparoscopic procedure and carbon dioxide gas inflation during surgery. Pt verbalized understanding. Will cont to monitor. 2351  TRANSFER - OUT REPORT: 
 
Verbal report given to Rafy RN (name) on [place]  being transferred to 2200 (unit) for routine post - op Report consisted of patients Situation, Background, Assessment and  
Recommendations(SBAR). Information from the following report(s) SBAR, Kardex, OR Summary, Intake/Output, MAR and Cardiac Rhythm sinus rhythm was reviewed with the receiving nurse. Lines:  
Peripheral IV 12/01/18 Left Antecubital (Active) Site Assessment Clean, dry, & intact 12/1/2018 10:45 PM  
Phlebitis Assessment 0 12/1/2018 10:45 PM  
Infiltration Assessment 0 12/1/2018 10:45 PM  
Dressing Status Clean, dry, & intact 12/1/2018 10:45 PM  
Dressing Type Transparent;Tape 12/1/2018 10:45 PM  
Hub Color/Line Status Infusing;Green 12/1/2018 10:45 PM  
Action Taken Open ports on tubing capped 12/1/2018 10:45 PM  
Alcohol Cap Used Yes 12/1/2018 10:45 PM  
  
 
Opportunity for questions and clarification was provided. Patient transported with: 
 Registered Nurse

## 2018-12-02 NOTE — PROGRESS NOTES
0715 Received pt in bed. Lap sites c/d/i. Clear on room air. IV site patent, c/d/i. Pain under control. Voiding to bathroom. Possibly going home today per report. Skin is intact. 1665 MD to pt room. Explained what was found in surgery and results of CT scan. Per MD, cysts were found and sent to biopsy for checking. Per MD, pt going home today around 1300 after the does of abx. Abd gerri suggested for pt, will get one. 1110 Discharge order in place. Will discharge pt once abx for 12nn completed. Per  pt will be going home with a cab, will call [facility]. Abd binder on hand. 5 Paged MD. Pt says MD allowed her to  her own car, per pt she takes her pain medicine at home. Will verify this. 0 Spoke with MD, per MD pt cannot drive her own car home. will relay this to pt. 
 
1300 Informed pt she cannot drive her car, pt agreed. Will get uber car for pt c/o . ABX done. IV dc'd, no sign of infiltration. Pain under control. No n/v. Abdominal binder in place, lap sites c/d/i. Discharge instructions given , pt verbalized understanding. No n/v.  
 
1400 Wheeled pt down. Uber car waiting, arranged by .

## 2018-12-02 NOTE — ANESTHESIA PREPROCEDURE EVALUATION
Anesthetic History No history of anesthetic complications Review of Systems / Medical History Patient summary reviewed and pertinent labs reviewed Pulmonary Smoker Neuro/Psych Psychiatric history Cardiovascular Hypertension Exercise tolerance: >4 METS 
  
GI/Hepatic/Renal 
Within defined limits Endo/Other Cancer Other Findings Comments: H/o gastric ca, partial gastrectomy 2008 Physical Exam 
 
Airway Mallampati: III 
TM Distance: 4 - 6 cm Neck ROM: normal range of motion Mouth opening: Normal 
 
 Cardiovascular Regular rate and rhythm,  S1 and S2 normal,  no murmur, click, rub, or gallop Rhythm: regular Rate: normal 
 
 
 
 Dental 
 
Dentition: Lower dentition intact and Upper dentition intact Pulmonary Breath sounds clear to auscultation Abdominal 
GI exam deferred Other Findings Anesthetic Plan ASA: 3 Anesthesia type: general 
 
 
 
 
Induction: Intravenous and RSI Anesthetic plan and risks discussed with: Patient

## 2018-12-07 LAB
BACTERIA SPEC CULT: NORMAL
SERVICE CMNT-IMP: NORMAL

## 2018-12-11 ENCOUNTER — OFFICE VISIT (OUTPATIENT)
Dept: SURGERY | Age: 48
End: 2018-12-11

## 2018-12-11 VITALS
TEMPERATURE: 95.9 F | HEART RATE: 104 BPM | SYSTOLIC BLOOD PRESSURE: 159 MMHG | HEIGHT: 66 IN | DIASTOLIC BLOOD PRESSURE: 102 MMHG | BODY MASS INDEX: 35.2 KG/M2 | WEIGHT: 219 LBS | OXYGEN SATURATION: 99 %

## 2018-12-11 DIAGNOSIS — K29.60 OTHER GASTRITIS WITHOUT HEMORRHAGE, UNSPECIFIED CHRONICITY: ICD-10-CM

## 2018-12-11 DIAGNOSIS — R19.8 IRREGULAR BOWEL HABITS: ICD-10-CM

## 2018-12-11 DIAGNOSIS — M62.830 MUSCLE SPASM OF BACK: ICD-10-CM

## 2018-12-11 DIAGNOSIS — I10 HYPERTENSION, UNSPECIFIED TYPE: ICD-10-CM

## 2018-12-11 DIAGNOSIS — Z90.49 STATUS POST LAPAROSCOPIC APPENDECTOMY: Primary | ICD-10-CM

## 2018-12-11 DIAGNOSIS — M54.6 ACUTE LEFT-SIDED THORACIC BACK PAIN: ICD-10-CM

## 2018-12-11 DIAGNOSIS — I10 ESSENTIAL HYPERTENSION: ICD-10-CM

## 2018-12-11 PROBLEM — K37 APPENDICITIS: Status: RESOLVED | Noted: 2018-12-01 | Resolved: 2018-12-11

## 2018-12-11 PROBLEM — E66.01 SEVERE OBESITY (HCC): Status: ACTIVE | Noted: 2018-12-11

## 2018-12-11 PROBLEM — K29.70 GASTRITIS: Status: ACTIVE | Noted: 2018-12-11

## 2018-12-11 RX ORDER — AMLODIPINE BESYLATE 10 MG/1
10 TABLET ORAL DAILY
Qty: 30 TAB | Refills: 1 | Status: SHIPPED | OUTPATIENT
Start: 2018-12-11 | End: 2020-03-18

## 2018-12-11 RX ORDER — METHOCARBAMOL 500 MG/1
500 TABLET, FILM COATED ORAL
Qty: 20 TAB | Refills: 0 | Status: SHIPPED | OUTPATIENT
Start: 2018-12-11 | End: 2020-03-18 | Stop reason: ALTCHOICE

## 2018-12-11 NOTE — PATIENT INSTRUCTIONS
If you have any questions or concerns about today's appointment, the verbal and/or written instructions you were given for follow up care, please call our office at [phone].     [facility] - [address]    [phone] office  [phone]

## 2018-12-11 NOTE — PROGRESS NOTES
Chief Complaint   Patient presents with    Post OP Follow Up     appendectomy      Patient reports that she is still having back pain and she also has pain that is radiating from the back to under her left breast, pain 5/10, exacerbated by activity. Denies exacerbation from food. She reports a diminished appetite bu that she is experiencing increased thirst. Patient reports that she is having formed stools that start formed but result in diarrhea, she reports tolerating a solid diet. 1. Have you been to the ER, urgent care clinic since your last visit? Hospitalized since your last visit? No    2. Have you seen or consulted any other health care providers outside of the [address] [place] [address] since your last visit? Include any pap smears or colon screening.  No

## 2018-12-11 NOTE — PROGRESS NOTES
Subjective:      Blayne Shepherd is a 46 y.o. female presents for postop care 9 days status post lap appy. Appetite is decreased for her. Eating a regular diet without difficulty. Bowel movements are starting hard, then diarrhea after which is a new problem for her. The patient is voiding without difficulty. Pain is controlled with current analgesics. Medication(s) being used:percocet and naprosyn as prescribed by oncology. Back pain is still there, and worse at times seems to be when sitting or standing a lot. Muscle relaxers help. Would like a refill. Needs refill for HTN med prescribed at hospital.  Working on insurance. Objective:     Visit Vitals  BP (!) 159/102 (BP Patient Position: Sitting)   Pulse (!) 104   Temp 95.9 °F (35.5 °C) (Oral)   Ht 5' 6\" (1.676 m)   Wt 99.3 kg (219 lb)   SpO2 99%   BMI 35.35 kg/m²     On her amlodipine. No PCP. General:  alert, cooperative, no distress   Abdomen: soft, min tender around edge of prev VHR and LUQ   Incision:   healing well, no drainage, no erythema, no hernia, no seroma, no swelling, no dehiscence, incision well approximated   She is tender with palpation over the left back inferior to the scapula    Pathology: acute appendicitis with acute serositis and benign mesothelial cysts with mild acute inflammation. Assessment/Plan:     1. S/p appy doing well as far as the surgery is concerned - Given copy of path and reviewed with her. 2. Concern that the LUQ discomfort could be gastritis - Advised her to stop NSAIDS and naproxyn. Can try a PPI over the counter and she did not want a script for this. Advised avoiding the NSAIDS, tobacco, ETOH, and caffeine. Advised to see GI for colonoscopy, and can consider EGD with this. 3. Musculoskeletal back pain - advised stretching, heat, and massage. Gave a refill of muscle relaxer, and told her to wean off. Advised her to discuss with a PCP (when set up) physical therapy if she is not better.     4. Renal mass/nodule - this is managed by oncology, and I advised her to discuss with oncology which she may want to do now rather than next summer. 5. Hx gastric cancer -  Keep routine f/u with oncology as the area is stable on CT. 6. HTN - Needed refill of meds. pt to increase the dose to 10mg for her amlodipine. She was made aware that she needs to see PCP for this. I d/w her checking her BP at pharmacy and discussed with her symptoms of low BP.    7. Bowel irregularity - advised a fiber supplement. She was also advised that she is due for colonoscopy, and needs to see GI for this.       Rosy Blackmon MD

## 2020-03-18 ENCOUNTER — APPOINTMENT (OUTPATIENT)
Dept: GENERAL RADIOLOGY | Age: 50
End: 2020-03-18
Attending: EMERGENCY MEDICINE
Payer: MEDICAID

## 2020-03-18 ENCOUNTER — HOSPITAL ENCOUNTER (EMERGENCY)
Age: 50
Discharge: HOME | End: 2020-03-18
Attending: EMERGENCY MEDICINE
Payer: MEDICAID

## 2020-03-18 VITALS
HEIGHT: 66 IN | DIASTOLIC BLOOD PRESSURE: 79 MMHG | OXYGEN SATURATION: 99 % | TEMPERATURE: 98.1 F | SYSTOLIC BLOOD PRESSURE: 136 MMHG | HEART RATE: 86 BPM | WEIGHT: 215 LBS | BODY MASS INDEX: 34.55 KG/M2 | RESPIRATION RATE: 16 BRPM

## 2020-03-18 DIAGNOSIS — M79.601 RIGHT ARM PAIN: ICD-10-CM

## 2020-03-18 DIAGNOSIS — R11.2 NON-INTRACTABLE VOMITING WITH NAUSEA, UNSPECIFIED VOMITING TYPE: Primary | ICD-10-CM

## 2020-03-18 DIAGNOSIS — J43.9 PULMONARY EMPHYSEMA, UNSPECIFIED EMPHYSEMA TYPE (HCC): ICD-10-CM

## 2020-03-18 LAB
ALBUMIN SERPL-MCNC: 3.7 G/DL (ref 3.4–5)
ALBUMIN/GLOB SERPL: 1 {RATIO} (ref 0.8–1.7)
ALP SERPL-CCNC: 68 U/L (ref 45–117)
ALT SERPL-CCNC: 40 U/L (ref 13–56)
ANION GAP SERPL CALC-SCNC: 7 MMOL/L (ref 3–18)
AST SERPL-CCNC: 57 U/L (ref 10–38)
BASOPHILS # BLD: 0 K/UL (ref 0–0.1)
BASOPHILS NFR BLD: 1 % (ref 0–2)
BILIRUB SERPL-MCNC: 0.4 MG/DL (ref 0.2–1)
BUN SERPL-MCNC: 15 MG/DL (ref 7–18)
BUN/CREAT SERPL: 14 (ref 12–20)
CALCIUM SERPL-MCNC: 8.9 MG/DL (ref 8.5–10.1)
CHLORIDE SERPL-SCNC: 104 MMOL/L (ref 100–111)
CO2 SERPL-SCNC: 26 MMOL/L (ref 21–32)
CREAT SERPL-MCNC: 1.06 MG/DL (ref 0.6–1.3)
DIFFERENTIAL METHOD BLD: ABNORMAL
EOSINOPHIL # BLD: 0.2 K/UL (ref 0–0.4)
EOSINOPHIL NFR BLD: 3 % (ref 0–5)
ERYTHROCYTE [DISTWIDTH] IN BLOOD BY AUTOMATED COUNT: 12.3 % (ref 11.6–14.5)
GLOBULIN SER CALC-MCNC: 3.8 G/DL (ref 2–4)
GLUCOSE SERPL-MCNC: 98 MG/DL (ref 74–99)
HCG SERPL QL: NEGATIVE
HCT VFR BLD AUTO: 44.6 % (ref 35–45)
HGB BLD-MCNC: 15.5 G/DL (ref 12–16)
LYMPHOCYTES # BLD: 4 K/UL (ref 0.9–3.6)
LYMPHOCYTES NFR BLD: 46 % (ref 21–52)
MCH RBC QN AUTO: 33.7 PG (ref 24–34)
MCHC RBC AUTO-ENTMCNC: 34.8 G/DL (ref 31–37)
MCV RBC AUTO: 97 FL (ref 74–97)
MONOCYTES # BLD: 0.5 K/UL (ref 0.05–1.2)
MONOCYTES NFR BLD: 7 % (ref 3–10)
NEUTS SEG # BLD: 3.6 K/UL (ref 1.8–8)
NEUTS SEG NFR BLD: 43 % (ref 40–73)
PLATELET # BLD AUTO: 421 K/UL (ref 135–420)
PMV BLD AUTO: 11.1 FL (ref 9.2–11.8)
POTASSIUM SERPL-SCNC: 5.1 MMOL/L (ref 3.5–5.5)
PROT SERPL-MCNC: 7.5 G/DL (ref 6.4–8.2)
RBC # BLD AUTO: 4.6 M/UL (ref 4.2–5.3)
SODIUM SERPL-SCNC: 137 MMOL/L (ref 136–145)
TROPONIN I SERPL-MCNC: <0.02 NG/ML (ref 0–0.04)
WBC # BLD AUTO: 8.3 K/UL (ref 4.6–13.2)

## 2020-03-18 PROCEDURE — 96375 TX/PRO/DX INJ NEW DRUG ADDON: CPT

## 2020-03-18 PROCEDURE — 71045 X-RAY EXAM CHEST 1 VIEW: CPT

## 2020-03-18 PROCEDURE — 93005 ELECTROCARDIOGRAM TRACING: CPT

## 2020-03-18 PROCEDURE — 74011250636 HC RX REV CODE- 250/636: Performed by: EMERGENCY MEDICINE

## 2020-03-18 PROCEDURE — 96361 HYDRATE IV INFUSION ADD-ON: CPT

## 2020-03-18 PROCEDURE — 96374 THER/PROPH/DIAG INJ IV PUSH: CPT

## 2020-03-18 PROCEDURE — 84484 ASSAY OF TROPONIN QUANT: CPT

## 2020-03-18 PROCEDURE — 94640 AIRWAY INHALATION TREATMENT: CPT

## 2020-03-18 PROCEDURE — 74011000250 HC RX REV CODE- 250: Performed by: EMERGENCY MEDICINE

## 2020-03-18 PROCEDURE — 99283 EMERGENCY DEPT VISIT LOW MDM: CPT

## 2020-03-18 PROCEDURE — 84703 CHORIONIC GONADOTROPIN ASSAY: CPT

## 2020-03-18 PROCEDURE — 85025 COMPLETE CBC W/AUTO DIFF WBC: CPT

## 2020-03-18 PROCEDURE — 80053 COMPREHEN METABOLIC PANEL: CPT

## 2020-03-18 RX ORDER — KETOROLAC TROMETHAMINE 30 MG/ML
30 INJECTION, SOLUTION INTRAMUSCULAR; INTRAVENOUS
Status: COMPLETED | OUTPATIENT
Start: 2020-03-18 | End: 2020-03-18

## 2020-03-18 RX ORDER — IBUPROFEN 600 MG/1
600 TABLET ORAL
Qty: 20 TAB | Refills: 0 | Status: SHIPPED | OUTPATIENT
Start: 2020-03-18

## 2020-03-18 RX ORDER — ALBUTEROL SULFATE 90 UG/1
2 AEROSOL, METERED RESPIRATORY (INHALATION)
Qty: 1 INHALER | Refills: 3 | Status: SHIPPED | OUTPATIENT
Start: 2020-03-18

## 2020-03-18 RX ORDER — IPRATROPIUM BROMIDE AND ALBUTEROL SULFATE 2.5; .5 MG/3ML; MG/3ML
3 SOLUTION RESPIRATORY (INHALATION)
Status: COMPLETED | OUTPATIENT
Start: 2020-03-18 | End: 2020-03-18

## 2020-03-18 RX ORDER — ONDANSETRON 4 MG/1
4 TABLET, ORALLY DISINTEGRATING ORAL
Qty: 12 TAB | Refills: 0 | Status: SHIPPED | OUTPATIENT
Start: 2020-03-18

## 2020-03-18 RX ORDER — ONDANSETRON 2 MG/ML
4 INJECTION INTRAMUSCULAR; INTRAVENOUS
Status: COMPLETED | OUTPATIENT
Start: 2020-03-18 | End: 2020-03-18

## 2020-03-18 RX ORDER — HYDROCODONE BITARTRATE AND ACETAMINOPHEN 5; 300 MG/1; MG/1
TABLET ORAL
COMMUNITY

## 2020-03-18 RX ORDER — FAMOTIDINE 10 MG/ML
20 INJECTION INTRAVENOUS
Status: COMPLETED | OUTPATIENT
Start: 2020-03-18 | End: 2020-03-18

## 2020-03-18 RX ADMIN — FAMOTIDINE 20 MG: 10 INJECTION INTRAVENOUS at 21:36

## 2020-03-18 RX ADMIN — SODIUM CHLORIDE 1000 ML: 900 INJECTION, SOLUTION INTRAVENOUS at 21:32

## 2020-03-18 RX ADMIN — ONDANSETRON 4 MG: 2 INJECTION INTRAMUSCULAR; INTRAVENOUS at 21:36

## 2020-03-18 RX ADMIN — IPRATROPIUM BROMIDE AND ALBUTEROL SULFATE 3 ML: .5; 3 SOLUTION RESPIRATORY (INHALATION) at 21:36

## 2020-03-18 RX ADMIN — KETOROLAC TROMETHAMINE 30 MG: 30 INJECTION, SOLUTION INTRAMUSCULAR at 21:36

## 2020-03-19 ENCOUNTER — PATIENT OUTREACH (OUTPATIENT)
Dept: CASE MANAGEMENT | Age: 50
End: 2020-03-19

## 2020-03-19 LAB
ATRIAL RATE: 70 BPM
CALCULATED P AXIS, ECG09: 60 DEGREES
CALCULATED R AXIS, ECG10: 35 DEGREES
CALCULATED T AXIS, ECG11: 59 DEGREES
DIAGNOSIS, 93000: NORMAL
P-R INTERVAL, ECG05: 160 MS
Q-T INTERVAL, ECG07: 416 MS
QRS DURATION, ECG06: 68 MS
QTC CALCULATION (BEZET), ECG08: 449 MS
VENTRICULAR RATE, ECG03: 70 BPM

## 2020-03-19 NOTE — ED PROVIDER NOTES
Mikhail Lloyd is a 44 y.o. female with remote history of gastric cancer onset of nausea and vomiting with dizziness and some slight diarrhea that started earlier today. Patient states she also has started having pain in her right upper arm as well that radiated from her neck as well. Patient has any fever. She has a chronic cough without any significant change in sputum color. She is some urinary frequency which she is had for a while. Patient that she has a history of emphysema is noted she been wheezing as well. No sore throat or congestion. No abdominal pain. The history is provided by the patient and medical records. 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 No pertinent family history.     Social History     Socioeconomic History    Marital status: SINGLE     Spouse name: Not on file    Number of children: Not on file    Years of education: Not on file    Highest education level: Not on file   Occupational History    Not on file   Social Needs    Financial resource strain: Not on file    Food insecurity     Worry: Not on file     Inability: Not on file    Transportation needs     Medical: Not on file     Non-medical: Not on file   Tobacco Use    Smoking status: Current Some Day Smoker     Packs/day: 1.00    Smokeless tobacco: Former User   Substance and Sexual Activity    Alcohol use: No     Frequency: Never    Drug use: No    Sexual activity: Not on file   Lifestyle    Physical activity     Days per week: Not on file     Minutes per session: Not on file    Stress: Not on file   Relationships    Social connections     Talks on phone: Not on file     Gets together: Not on file     Attends Jewish service: Not on file     Active member of club or organization: Not on file     Attends meetings of clubs or organizations: Not on file     Relationship status: Not on file    Intimate partner violence     Fear of current or ex partner: Not on file     Emotionally abused: Not on file     Physically abused: Not on file     Forced sexual activity: Not on file   Other Topics Concern    Not on file   Social History Narrative    Not on file         ALLERGIES: Sulfa (sulfonamide antibiotics) and Celexa [citalopram]    Review of Systems   Constitutional: Positive for chills and fatigue. Negative for fever. HENT: Negative for sore throat and trouble swallowing. Eyes: Negative for visual disturbance. Respiratory: Positive for cough. Negative for shortness of breath. Cardiovascular: Negative for chest pain. Gastrointestinal: Negative for abdominal pain. Genitourinary: Positive for frequency. Negative for difficulty urinating, dysuria and hematuria. Musculoskeletal: Positive for myalgias. Skin: Negative for rash. Allergic/Immunologic: Negative for immunocompromised state. Neurological: Positive for dizziness and light-headedness. Negative for syncope and headaches. Psychiatric/Behavioral: Positive for sleep disturbance. Vitals:    03/18/20 2050   BP: 131/87   Pulse: 82   Resp: 20   Temp: 98.6 °F (37 °C)   SpO2: 96%   Weight: 97.5 kg (215 lb)   Height: 5' 6\" (1.676 m)            Physical Exam  Vitals signs and nursing note reviewed. Constitutional:       General: She is not in acute distress. Appearance: She is well-developed. She is obese. She is not ill-appearing, toxic-appearing or diaphoretic. HENT:      Head: Normocephalic and atraumatic. Right Ear: Tympanic membrane and external ear normal.      Left Ear: Tympanic membrane and external ear normal.      Nose: Nose normal. No congestion. Mouth/Throat:      Pharynx: Uvula midline. Eyes:      General: No scleral icterus.      Conjunctiva/sclera: Conjunctivae normal. Neck:      Musculoskeletal: Neck supple. Cardiovascular:      Rate and Rhythm: Normal rate and regular rhythm. Heart sounds: Normal heart sounds. Pulmonary:      Effort: Pulmonary effort is normal. No tachypnea, accessory muscle usage or respiratory distress. Breath sounds: No stridor or decreased air movement. Wheezing present. No decreased breath sounds, rhonchi or rales. Abdominal:      Palpations: Abdomen is soft. Tenderness: There is no abdominal tenderness. Musculoskeletal:      Right lower leg: No edema. Left lower leg: No edema. Comments: There is tenderness along the right upper extremity into the trapezial area with no rash or swelling. Patient is normal distal pulses and sensation to light touch. Skin:     General: Skin is warm and dry. Capillary Refill: Capillary refill takes less than 2 seconds. Neurological:      Mental Status: She is alert and oriented to person, place, and time.       Gait: Gait normal.   Psychiatric:         Behavior: Behavior normal.          MDM       Procedures    Vitals:  Patient Vitals for the past 12 hrs:   Temp Pulse Resp BP SpO2   03/18/20 2050 98.6 °F (37 °C) 82 20 131/87 96 %         Medications ordered:   Medications   sodium chloride 0.9 % bolus infusion 1,000 mL (1,000 mL IntraVENous New Bag 3/18/20 2132)   ondansetron (ZOFRAN) injection 4 mg (4 mg IntraVENous Given 3/18/20 2136)   famotidine (PF) (PEPCID) injection 20 mg (20 mg IntraVENous Given 3/18/20 2136)   albuterol-ipratropium (DUO-NEB) 2.5 MG-0.5 MG/3 ML (3 mL Nebulization Given 3/18/20 2136)   ketorolac (TORADOL) injection 30 mg (30 mg IntraVENous Given 3/18/20 2136)         Lab findings:  Recent Results (from the past 12 hour(s))   CBC WITH AUTOMATED DIFF    Collection Time: 03/18/20  9:21 PM   Result Value Ref Range    WBC 8.3 4.6 - 13.2 K/uL    RBC 4.60 4.20 - 5.30 M/uL    HGB 15.5 12.0 - 16.0 g/dL    HCT 44.6 35.0 - 45.0 %    MCV 97.0 74.0 - 97.0 FL    MCH 33.7 24.0 - 34.0 PG    MCHC 34.8 31.0 - 37.0 g/dL    RDW 12.3 11.6 - 14.5 %    PLATELET 512 (H) 713 - 420 K/uL    MPV 11.1 9.2 - 11.8 FL    NEUTROPHILS 43 40 - 73 %    LYMPHOCYTES 46 21 - 52 %    MONOCYTES 7 3 - 10 %    EOSINOPHILS 3 0 - 5 %    BASOPHILS 1 0 - 2 %    ABS. NEUTROPHILS 3.6 1.8 - 8.0 K/UL    ABS. LYMPHOCYTES 4.0 (H) 0.9 - 3.6 K/UL    ABS. MONOCYTES 0.5 0.05 - 1.2 K/UL    ABS. EOSINOPHILS 0.2 0.0 - 0.4 K/UL    ABS. BASOPHILS 0.0 0.0 - 0.1 K/UL    DF AUTOMATED     METABOLIC PANEL, COMPREHENSIVE    Collection Time: 03/18/20  9:21 PM   Result Value Ref Range    Sodium 137 136 - 145 mmol/L    Potassium 5.1 3.5 - 5.5 mmol/L    Chloride 104 100 - 111 mmol/L    CO2 26 21 - 32 mmol/L    Anion gap 7 3.0 - 18 mmol/L    Glucose 98 74 - 99 mg/dL    BUN 15 7.0 - 18 MG/DL    Creatinine 1.06 0.6 - 1.3 MG/DL    BUN/Creatinine ratio 14 12 - 20      GFR est AA >60 >60 ml/min/1.73m2    GFR est non-AA 55 (L) >60 ml/min/1.73m2    Calcium 8.9 8.5 - 10.1 MG/DL    Bilirubin, total 0.4 0.2 - 1.0 MG/DL    ALT (SGPT) 40 13 - 56 U/L    AST (SGOT) 57 (H) 10 - 38 U/L    Alk. phosphatase 68 45 - 117 U/L    Protein, total 7.5 6.4 - 8.2 g/dL    Albumin 3.7 3.4 - 5.0 g/dL    Globulin 3.8 2.0 - 4.0 g/dL    A-G Ratio 1.0 0.8 - 1.7     TROPONIN I    Collection Time: 03/18/20  9:21 PM   Result Value Ref Range    Troponin-I, QT <0.02 0.0 - 0.045 NG/ML   HCG QL SERUM    Collection Time: 03/18/20  9:21 PM   Result Value Ref Range    HCG, Ql. NEGATIVE  NEG         EKG interpretation by ED Physician:  Normal sinus rhythm with no acute ST or T wave changes  Rate 70 QRS 68   No previous EKG    X-Ray, CT or other radiology findings or impressions:  XR CHEST PORT    (Results Pending)   No acute process per my interpretation    Progress notes, Consult notes or additional Procedure notes:   Do not feel patient requires any other testing or work-up at this time.   Patient with no new productive cough or fever or symptoms currently to warrant further testing to include coronavirus    I have discussed with patient and/or family/sig other the results, interpretation of any imaging if performed, suspected diagnosis and treatment plan to include instructions regarding the diagnoses listed to which understanding was expressed with all questions answered      Reevaluation of patient:   stable    Disposition:  Diagnosis:   1. Non-intractable vomiting with nausea, unspecified vomiting type    2. Right arm pain    3. Pulmonary emphysema, unspecified emphysema type ([address]        Disposition: home      Follow-up Information     Follow up With Specialties Details Why Contact [facility]  Schedule an appointment as soon as possible for a visit  [address] [place] [address] EMERGENCY DEPT Emergency Medicine  If symptoms worsen [address] [phone]            Patient's Medications   Start Taking    ALBUTEROL (PROVENTIL HFA, VENTOLIN HFA, PROAIR HFA) 90 MCG/ACTUATION INHALER    Take 2 Puffs by inhalation every four (4) hours as needed for Wheezing or Shortness of Breath. IBUPROFEN (MOTRIN) 600 MG TABLET    Take 1 Tab by mouth every six (6) hours as needed for Pain (with food). ONDANSETRON (ZOFRAN ODT) 4 MG DISINTEGRATING TABLET    Take 1 Tab by mouth every eight (8) hours as needed for Nausea or Vomiting. Continue Taking    HYDROCODONE-ACETAMINOPHEN (VICODIN) 5-300 MG TABLET    Vicodin 5 mg-500 mg tablet   Take 1 tablet every 4 hours by oral route. These Medications have changed    No medications on file   Stop Taking    AMLODIPINE (NORVASC) 10 MG TABLET    Take 1 Tab by mouth daily. DIAZEPAM (VALIUM) 5 MG TABLET    1 tab every 8 hours as needed pain. Watch sedation    METHOCARBAMOL (ROBAXIN) 500 MG TABLET    Take 2 Tabs by mouth four (4) times daily as needed. METHOCARBAMOL (ROBAXIN) 500 MG TABLET    Take 1 Tab by mouth four (4) times daily as needed.     NALOXONE [facility]) 4 MG/ACTUATION NASAL SPRAY    Use 1 spray intranasally, then discard. Repeat with new spray every 2 min as needed for opioid overdose symptoms, alternating nostrils. OXYCODONE-ACETAMINOPHEN (PERCOCET) 5-325 MG PER TABLET    Take 1-2 Tabs by mouth every four (4) hours as needed for Pain.

## 2020-03-19 NOTE — ED TRIAGE NOTES
Pt to triage c/o N/V/D x 1 day. States she has emphysema and has been coughing but its fairly standard for her. However she is having upper back pain 'in her wings' that she thinks may be r/t to emphysema as well.  Just got insurance, does not have pulmonologist.  States urinary frequency and chills but feels that's r/t to menopause  States today has right arm pain from shoulder to fingers, and aches and pains all over with reports of lethargy

## 2020-03-19 NOTE — PROGRESS NOTES
Rajesh Sylvester COVID-19 Screening Initial Follow-up Note    Patient contacted regarding COVID-19  risk. Care Transition Nurse/ Ambulatory Care Manager contacted the patient by telephone to perform post discharge assessment. Verified name and  with patient as identifiers. Provided introduction to self, and explanation of the CTN/ACM role, and reason for call due to risk factors for infection and/or exposure to COVID-19. Symptoms reviewed with patient who verbalized the following symptoms:   Fever no; unable to assess   Fatigue no   Pain or aching joints no  Cough yes  Shortness of breath no    Confusion or unusual change in mental status no    Chills or shaking yes    Sweating yes    Fast heart rate no    Fast breathing no    Dizziness/lightheadedness no    Less urine output no    Cold, clammy, and pale skin yes  Low body temperature unable to assess; does not have thermometer       Due to onset/worsening of new symptoms, encounter routed to provider for escalation: no      Patient has following risk factors of: asthma CTN/ACM reviewed discharge instructions, medical action plan and red flags such as increased shortness of breath, increasing fever and signs of decompensation with patient who verbalized understanding. Discussed exposure protocols and quarantine with CDC Guidelines What to do if you are sick with coronavirus disease 2019 Patient who was given an opportunity for questions and concerns. The patient agrees to contact the local health department or PCP office for questions related to their healthcare. CTN provided contact information for future reference.     Reviewed and educated patient on any new and changed medications related to discharge diagnosis     Plan for follow-up call in 1-2 days based on severity of symptoms and risk factors

## 2020-03-19 NOTE — DISCHARGE INSTRUCTIONS
Patient Education        Learning About Emphysema  What is emphysema? Emphysema is damage to the air sacs in your lungs. In a healthy person, the tiny air sacs in the lungs are like balloons. As you breathe in and out, they get bigger and smaller to move air through your lungs. With emphysema, these air sacs lose their stretch. Less oxygen gets into your blood and you feel short of breath. Emphysema is a form of COPD (chronic obstructive pulmonary disease). Emphysema is usually caused by smoking. But chemical fumes, dust, or air pollution also can cause it over time. People who get it in their 35s or 45s may have a disorder that runs in families, called alpha-1 antitrypsin deficiency. But this is rare. What can you expect when you have emphysema? Emphysema gets worse over time. You cannot undo the damage to your lungs. Over time, you may find that:  · You get short of breath even when you do simple things like get dressed or fix a meal.  · It is hard to eat or exercise. · You lose weight and feel weaker. But there are things you can do to prevent more damage and feel better. What are the symptoms? The main symptoms of emphysema are:  · A cough that will not go away. · Mucus that comes up when you cough. · Shortness of breath that gets worse when you exercise. At times, your symptoms may suddenly flare up and get much worse. This is a called an exacerbation (say \"egg-ADELA--BAY-chauncey\"). When this happens, your usual symptoms quickly get worse and stay bad. This can be dangerous. You may have to go to the hospital.  How can you keep emphysema from getting worse? Don't smoke. That is the best way to keep emphysema from getting worse. If you already smoke, it is never too late to stop. If you need help quitting, talk to your doctor about stop-smoking programs and medicines. These can increase your chances of quitting for good.   You can do other things to keep emphysema from getting worse:  · Avoid bad air. Air pollution, chemical fumes, and dust also can make emphysema worse. · Get a flu shot every year. A shot may keep the flu from turning into something more serious, like pneumonia. A flu shot also may lower your chances of having a flare-up. · Get a pneumococcal shot. A shot can prevent some of the serious complications of pneumonia. Ask your doctor how often you should get this shot. How is emphysema treated? Emphysema is treated with medicines and oxygen. You also can take steps at home to stay healthy and keep your condition from getting worse. Medicines and oxygen therapy  · You may be taking medicines such as:  ? Bronchodilators. These help open your airways and make breathing easier. Bronchodilators are either short-acting (work for 6 to 9 hours) or long-acting (work for 24 hours). You inhale most bronchodilators, so they start to act quickly. Always carry your quick-relief inhaler with you in case you need it while you are away from home. ? Corticosteroids. These reduce airway inflammation. They come in pill or inhaled form. You must take these medicines every day for them to work well. ? Antibiotics. These medicines are used when you have a bacterial lung infection. · Take your medicines exactly as prescribed. Call your doctor if you think you are having a problem with your medicine. · Oxygen therapy boosts the amount of oxygen in your blood and helps you breathe easier. Use the flow rate your doctor has recommended, and do not change it without talking to your doctor first.  Other care at home  · If your doctor recommends it, get more exercise. Walking is a good choice. Bit by bit, increase the amount you walk every day. Try for at least 30 minutes on most days of the week. · Learn breathing methods--such as breathing through pursed lips--to help you become less short of breath.   · If your doctor has not set you up with a pulmonary rehabilitation program, talk to him or her about whether rehab is right for you. Rehab includes exercise programs, education about your disease and how to manage it, help with diet and other changes, and emotional support. · Eat regular, healthy meals. Use bronchodilators about 1 hour before you eat to make it easier to eat. Eat several small meals instead of three large ones. Drink beverages at the end of the meal. Avoid foods that are hard to chew. Follow-up care is a key part of your treatment and safety. Be sure to make and go to all appointments, and call your doctor if you are having problems. It's also a good idea to know your test results and keep a list of the medicines you take. Where can you learn more? Go to http://ashleeTrueAbilityirma.info/  Enter L488 in the search box to learn more about \"Learning About Emphysema. \"  Current as of: June 9, 2019Content Version: 12.4  © 2611-9342 [employer]. Care instructions adapted under license by [employer] (which disclaims liability or warranty for this information). If you have questions about a medical condition or this instruction, always ask your healthcare professional. Alph[place] s any warranty or liability for your use of this information. Patient Education        Nausea and Vomiting: Care Instructions  Your Care Instructions    When you are nauseated, you may feel weak and sweaty and notice a lot of saliva in your mouth. Nausea often leads to vomiting. Most of the time you do not need to worry about nausea and vomiting, but they can be signs of other illnesses. Two common causes of nausea and vomiting are stomach flu and food poisoning. Nausea and vomiting from viral stomach flu will usually start to improve within 24 hours. Nausea and vomiting from food poisoning may last from 12 to 48 hours. The doctor has checked you carefully, but problems can develop later.  If you notice any problems or new symptoms, get medical treatment right away.  Follow-up care is a key part of your treatment and safety. Be sure to make and go to all appointments, and call your doctor if you are having problems. It's also a good idea to know your test results and keep a list of the medicines you take. How can you care for yourself at home? · To prevent dehydration, drink plenty of fluids, enough so that your urine is light yellow or clear like water. Choose water and other caffeine-free clear liquids until you feel better. If you have kidney, heart, or liver disease and have to limit fluids, talk with your doctor before you increase the amount of fluids you drink. · Rest in bed until you feel better. · When you are able to eat, try clear soups, mild foods, and liquids until all symptoms are gone for 12 to 48 hours. Other good choices include dry toast, crackers, cooked cereal, and gelatin dessert, such as Jell-O. When should you call for help? Call 911 anytime you think you may need emergency care. For example, call if:    · You passed out (lost consciousness).    Call your doctor now or seek immediate medical care if:    · You have symptoms of dehydration, such as:  ? Dry eyes and a dry mouth. ? Passing only a little dark urine. ? Feeling thirstier than usual.     · You have new or worsening belly pain.     · You have a new or higher fever.     · You vomit blood or what looks like coffee grounds.    Watch closely for changes in your health, and be sure to contact your doctor if:    · You have ongoing nausea and vomiting.     · Your vomiting is getting worse.     · Your vomiting lasts longer than 2 days.     · You are not getting better as expected. Where can you learn more? Go to http://ashlee-irma.info/  Enter H591 in the search box to learn more about \"Nausea and Vomiting: Care Instructions. \"  Current as of: June 26, 2019Content Version: 12.4  © 2863-6275 [employer].   Care instructions adapted under license by [employer] (which disclaims liability or warranty for this information). If you have questions about a medical condition or this instruction, always ask your healthcare professional. [address] any warranty or liability for your use of this information.

## 2020-03-20 ENCOUNTER — PATIENT OUTREACH (OUTPATIENT)
Dept: CASE MANAGEMENT | Age: 50
End: 2020-03-20

## 2020-03-20 NOTE — PROGRESS NOTES
Attempted to reach patient to provide PCP info to establish care. No answer; unable to leave message due to voicemail issues. Will attempt to contact at a later time.

## 2020-04-02 ENCOUNTER — PATIENT OUTREACH (OUTPATIENT)
Dept: CASE MANAGEMENT | Age: 50
End: 2020-04-02

## (undated) DEVICE — ENDOLOOP SUT LIGATURE 18IN -- 12/BX PDS II

## (undated) DEVICE — PREP SKN CHLRAPRP 26ML TNT -- CONVERT TO ITEM 373320

## (undated) DEVICE — TROCAR LAP L100MM DIA5MM BLDELSS W/ STBL SL ENDOPATH XCEL

## (undated) DEVICE — SUTURE ENDOLOOP SZ 0 L18IN ABSRB VLT LIG SLDE KNOT VCRL

## (undated) DEVICE — STERILE POLYISOPRENE POWDER-FREE SURGICAL GLOVES: Brand: PROTEXIS

## (undated) DEVICE — TROCAR ENDOSCP L100MM DIA12MM STBL SL BLDELSS ENDOPATH XCEL

## (undated) DEVICE — NEEDLE INSUF L120MM DIA2MM DISP FOR PNEUMOPERI ENDOPATH

## (undated) DEVICE — VISUALIZATION SYSTEM: Brand: CLEARIFY

## (undated) DEVICE — BAG SPEC REM 224ML W4XL6IN DIA10MM 1 HND GYN DISP ENDOPCH

## (undated) DEVICE — SOLUTION IV 1000ML 0.9% SOD CHL

## (undated) DEVICE — [HIGH FLOW INSUFFLATOR,  DO NOT USE IF PACKAGE IS DAMAGED,  KEEP DRY,  KEEP AWAY FROM SUNLIGHT,  PROTECT FROM HEAT AND RADIOACTIVE SOURCES.]: Brand: PNEUMOSURE

## (undated) DEVICE — SUTURE MCRYL SZ 4-0 L18IN ABSRB UD L19MM PS-2 3/8 CIR PRIM Y496G

## (undated) DEVICE — INTENDED FOR TISSUE SEPARATION, AND OTHER PROCEDURES THAT REQUIRE A SHARP SURGICAL BLADE TO PUNCTURE OR CUT.: Brand: BARD-PARKER ® CARBON RIB-BACK BLADES

## (undated) DEVICE — SLEEVE TRCR L100MM DIA5MM UNIV STBL FOR BLDELSS DIL TIP

## (undated) DEVICE — SUTURE PDS II SZ 1 L27IN ABSRB VLT CT-1 L36MM 1/2 CIR Z341H

## (undated) DEVICE — SHEAR HARMONIC ACET 5MMX36CM -- ACE PLUS

## (undated) DEVICE — DEPAUL LAP CHOLE PACK: Brand: MEDLINE INDUSTRIES, INC.

## (undated) DEVICE — SUTURE SZ 0 27IN 5/8 CIR UR-6  TAPER PT VIOLET ABSRB VICRYL J603H

## (undated) DEVICE — DISPOSABLE SUCTION/IRRIGATOR TUBE SET WITH TIP: Brand: AHTO

## (undated) DEVICE — KENDALL SCD EXPRESS SLEEVES, KNEE LENGTH, MEDIUM: Brand: KENDALL SCD

## (undated) DEVICE — APPLIER CLP M L L11.4IN DIA10MM ENDOSCP ROT MULT FOR LIG